# Patient Record
Sex: FEMALE | Race: WHITE | NOT HISPANIC OR LATINO | Employment: UNEMPLOYED
[De-identification: names, ages, dates, MRNs, and addresses within clinical notes are randomized per-mention and may not be internally consistent; named-entity substitution may affect disease eponyms.]

---

## 2017-08-27 ENCOUNTER — HEALTH MAINTENANCE LETTER (OUTPATIENT)
Age: 32
End: 2017-08-27

## 2017-11-11 ENCOUNTER — HOSPITAL ENCOUNTER (OUTPATIENT)
Dept: OBGYN | Facility: HOSPITAL | Age: 32
Discharge: HOME | End: 2017-11-12
Attending: OBSTETRICS & GYNECOLOGY | Admitting: OBSTETRICS & GYNECOLOGY

## 2017-11-11 ASSESSMENT — MIFFLIN-ST. JEOR: SCORE: 1425.24

## 2017-12-18 ENCOUNTER — RECORDS - HEALTHEAST (OUTPATIENT)
Dept: ADMINISTRATIVE | Facility: OTHER | Age: 32
End: 2017-12-18

## 2017-12-18 ENCOUNTER — ANESTHESIA - HEALTHEAST (OUTPATIENT)
Dept: OBGYN | Facility: HOSPITAL | Age: 32
End: 2017-12-18

## 2017-12-18 ENCOUNTER — SURGERY - HEALTHEAST (OUTPATIENT)
Dept: OBGYN | Facility: HOSPITAL | Age: 32
End: 2017-12-18

## 2017-12-18 ASSESSMENT — MIFFLIN-ST. JEOR: SCORE: 1477.4

## 2017-12-19 ENCOUNTER — HOME CARE/HOSPICE - HEALTHEAST (OUTPATIENT)
Dept: HOME HEALTH SERVICES | Facility: HOME HEALTH | Age: 32
End: 2017-12-19

## 2021-02-10 ENCOUNTER — OFFICE VISIT (OUTPATIENT)
Dept: FAMILY MEDICINE | Facility: CLINIC | Age: 36
End: 2021-02-10
Payer: COMMERCIAL

## 2021-02-10 VITALS
WEIGHT: 161.2 LBS | RESPIRATION RATE: 18 BRPM | BODY MASS INDEX: 29.66 KG/M2 | HEIGHT: 62 IN | SYSTOLIC BLOOD PRESSURE: 120 MMHG | OXYGEN SATURATION: 99 % | HEART RATE: 97 BPM | TEMPERATURE: 98.4 F | DIASTOLIC BLOOD PRESSURE: 66 MMHG

## 2021-02-10 DIAGNOSIS — B96.89 BV (BACTERIAL VAGINOSIS): ICD-10-CM

## 2021-02-10 DIAGNOSIS — Z30.8 ENCOUNTER FOR OTHER CONTRACEPTIVE MANAGEMENT: ICD-10-CM

## 2021-02-10 DIAGNOSIS — R10.2 PELVIC PRESSURE IN FEMALE: ICD-10-CM

## 2021-02-10 DIAGNOSIS — N76.0 BV (BACTERIAL VAGINOSIS): ICD-10-CM

## 2021-02-10 DIAGNOSIS — Z01.411 ENCOUNTER FOR GYNECOLOGICAL EXAMINATION WITH ABNORMAL FINDING: Primary | ICD-10-CM

## 2021-02-10 DIAGNOSIS — K64.9 HEMORRHOIDS, UNSPECIFIED HEMORRHOID TYPE: ICD-10-CM

## 2021-02-10 LAB
ALBUMIN UR-MCNC: NEGATIVE MG/DL
APPEARANCE UR: CLEAR
BILIRUB UR QL STRIP: ABNORMAL
COLOR UR AUTO: YELLOW
GLUCOSE UR STRIP-MCNC: NEGATIVE MG/DL
HCG UR QL: NEGATIVE
HGB UR QL STRIP: ABNORMAL
KETONES UR STRIP-MCNC: ABNORMAL MG/DL
LEUKOCYTE ESTERASE UR QL STRIP: NEGATIVE
NITRATE UR QL: NEGATIVE
NON-SQ EPI CELLS #/AREA URNS LPF: ABNORMAL /LPF
PH UR STRIP: 5.5 PH (ref 5–7)
RBC #/AREA URNS AUTO: ABNORMAL /HPF
SOURCE: ABNORMAL
SP GR UR STRIP: >1.03 (ref 1–1.03)
SPECIMEN SOURCE: ABNORMAL
UROBILINOGEN UR STRIP-ACNC: 0.2 EU/DL (ref 0.2–1)
WBC #/AREA URNS AUTO: ABNORMAL /HPF
WET PREP SPEC: ABNORMAL

## 2021-02-10 PROCEDURE — 87591 N.GONORRHOEAE DNA AMP PROB: CPT | Performed by: NURSE PRACTITIONER

## 2021-02-10 PROCEDURE — 99204 OFFICE O/P NEW MOD 45 MIN: CPT | Performed by: NURSE PRACTITIONER

## 2021-02-10 PROCEDURE — 87624 HPV HI-RISK TYP POOLED RSLT: CPT | Performed by: NURSE PRACTITIONER

## 2021-02-10 PROCEDURE — 87491 CHLMYD TRACH DNA AMP PROBE: CPT | Performed by: NURSE PRACTITIONER

## 2021-02-10 PROCEDURE — 81001 URINALYSIS AUTO W/SCOPE: CPT | Performed by: NURSE PRACTITIONER

## 2021-02-10 PROCEDURE — 81025 URINE PREGNANCY TEST: CPT | Performed by: NURSE PRACTITIONER

## 2021-02-10 PROCEDURE — 87210 SMEAR WET MOUNT SALINE/INK: CPT | Performed by: NURSE PRACTITIONER

## 2021-02-10 RX ORDER — NORELGESTROMIN AND ETHINYL ESTRADIOL 35; 150 UG/MG; UG/MG
PATCH TRANSDERMAL
Qty: 9 PATCH | Refills: 3 | Status: SHIPPED | OUTPATIENT
Start: 2021-02-10 | End: 2022-01-04

## 2021-02-10 RX ORDER — METRONIDAZOLE 500 MG/1
500 TABLET ORAL 2 TIMES DAILY
Qty: 14 TABLET | Refills: 0 | Status: SHIPPED | OUTPATIENT
Start: 2021-02-10 | End: 2021-02-17

## 2021-02-10 ASSESSMENT — MIFFLIN-ST. JEOR: SCORE: 1378.42

## 2021-02-10 NOTE — PROGRESS NOTES
Assessment & Plan     Encounter for gynecological examination with abnormal finding    - Pap imaged thin layer diagnostic with HPV (select HPV order below)  - HPV High Risk Types DNA Cervical    Pelvic pressure in female  Acute, pelvic pressure. History and current issue with constipation also. Urinalysis negative for infection, need hydration. Wet prep positive for bacterial vaginosis, treat as below. STD testing in process. Negative urine pregnancy. Will treat below pressure not improved patient to return to clinic.  - *UA reflex to Microscopic and Culture ([place] and [facility] (except [place] and [place])  - Wet prep  - HCG Qual, Urine ([id number])  - Urine Microscopic  - Chlamydia trachomatis PCR  - Neisseria gonorrhoeae PCR    Encounter for other contraceptive management  Not currently on contraception, wishes to start again as is more sexually active. Has been on patch before and did well. Will restart patch, check in in 3 months if any concerns.  - norelgestromin-ethinyl estradiol (ORTHO EVRA) 150-35 MCG/24HR patch; Remove old patch and apply new patch onto the skin once a week for 3 weeks (21 days). Do not wear patch week 4 (days 22-28), then repeat.    BV (bacterial vaginosis)  Wet prep positive for bacterial vaginosis. Treat with Flagyl twice daily for 7 days. Return to clinic if symptoms not improving or worsening.  - metroNIDAZOLE (FLAGYL) 500 MG tablet; Take 1 tablet (500 mg) by mouth 2 times daily for 7 days    Hemorrhoids, unspecified hemorrhoid type  Acute flare, recent constipation likely cause. Discussed at length increasing fiber and water intake. Recommend clearing out with one capful of MiraLAX nightly along with 2 Senokot until stools soft and clear. Then can use MiraLAX as needed. Otherwise follow with the guidance below for hemorrhoid treatment. If continues to have blood in stool please contact provider and will order a colonoscopy.                Tobacco Cessation:   reports that  "she has been smoking cigarettes. She has been smoking about 0.50 packs per day. She has never used smokeless tobacco.    BMI:   Estimated body mass index is 29.25 kg/m  as calculated from the following:    Height as of this encounter: 1.581 m (5' 2.25\").    Weight as of this encounter: 73.1 kg (161 lb 3.2 oz).       See Patient Instructions    Return in about 2 weeks (around 2/24/2021), or if symptoms worsen or fail to improve.    Alexia Druán, PRESTON CNP  [facility]    Yulissa Josue is a 35 year old who presents for the following health issues     HPI   Chief Complaint   Patient presents with     Rectal Problem     UTI     Contraception     Gyn Exam     pap, history of abnormal         Hemorrhoids  Onset/Duration: 2 weeks   Description:   Purnima-anal lump: YES  Pain: YES  Itching: YES  Accompanying Signs & Symptoms:  Blood in stool: YES - will be on underwear, with wiping, with stools  Changes in stool pattern: YES- constipation  History:   Any previous GI studies done:none  Family History of colon cancer: no  Precipitating factors:   None  Alleviating factors:  None  Therapies tried and outcome: miralax - once a week, A&D ointment to rectum    Had when was pregnant  Needs to drink more water  Fiber intake normal     Genitourinary - Female  Onset/Duration: worse last 2 days  Description:   Painful urination (Dysuria): YES           Frequency: YES  Blood in urine (Hematuria): no  Delay in urine (Hesitency): YES  Intensity: moderate  Progression of Symptoms:  worsening  Accompanying Signs & Symptoms:  Fever/chills: no  Flank pain: YES  Nausea and vomiting: no  Vaginal symptoms: pain  Abdominal/Pelvic Pain: YES  History:   History of frequent UTI s: YES in past year  History of kidney stones: no  Sexually Active: YES  Possibility of pregnancy: Don't Know  Precipitating or alleviating factors: None  Therapies tried and outcome:  Azo     Has intermittent shooting pain through vaginal area " "- every couple of days. Lasts for seconds and goes away     Contraception             Problems with current birth control method: NA  Method interested in: patch              Methods used previously: pill and patch  Problems with previous methods: no    History of pregnancies:         Patient's last menstrual period was 2021.           Lab Results   Component Value Date    PAP NIL 2012     : 6  Para: 4  Menstrual cycle: regular  Flow: medium        Breakthrough bleeding occurring? 0 What week of her month? 0  History of migraines: no but has been having headaches with vomiting for 2 months around her period time  Smoker: YES  1st degree relative with History of: 0                 Regular self breast exam: yes    Precipitating and/or Alleviating factors:    Currently sexually active: YES  Male, Female, both: male, condom  In stable relationship: YES  Desire STD testing: YES  Are you planning a pregnancy soon: no                 Body mass index is 29.25 kg/m .    Consider Labs: UPT, STD: Chlamydia/GONORRHEA screen, Pap      Review of Systems   Constitutional, HEENT, cardiovascular, pulmonary, gi and gu systems are negative, except as otherwise noted.      Objective    /66   Pulse 97   Temp 98.4  F (36.9  C)   Resp 18   Ht 1.581 m (5' 2.25\")   Wt 73.1 kg (161 lb 3.2 oz)   LMP 2021   SpO2 99%   Breastfeeding No   BMI 29.25 kg/m    Body mass index is 29.25 kg/m .  Physical Exam   GENERAL APPEARANCE: healthy, alert and no distress  RESP: lungs clear to auscultation - no rales, rhonchi or wheezes  CV: regular rates and rhythm, normal S1 S2, no S3 or S4 and no murmur, click or rub  ABDOMEN: soft, nontender, without hepatosplenomegaly or masses and bowel sounds normal   (female): normal cervix, adnexae, and uterus without masses or discharge  MS: extremities normal- no gross deformities noted  SKIN: no suspicious lesions or rashes  NEURO: Normal strength and tone, mentation intact " and speech normal  PSYCH: mentation appears normal and affect normal/bright    Results for orders placed or performed in visit on 02/10/21   *UA reflex to Microscopic and Culture ([place] and [facility] (except [place] and [place])     Status: Abnormal    Specimen: Midstream Urine   Result Value Ref Range    Color Urine Yellow     Appearance Urine Clear     Glucose Urine Negative NEG^Negative mg/dL    Bilirubin Urine Small (A) NEG^Negative    Ketones Urine Trace (A) NEG^Negative mg/dL    Specific Gravity Urine >1.030 1.003 - 1.035    Blood Urine Moderate (A) NEG^Negative    pH Urine 5.5 5.0 - 7.0 pH    Protein Albumin Urine Negative NEG^Negative mg/dL    Urobilinogen Urine 0.2 0.2 - 1.0 EU/dL    Nitrite Urine Negative NEG^Negative    Leukocyte Esterase Urine Negative NEG^Negative    Source Midstream Urine    HCG Qual, Urine ([id number])     Status: None   Result Value Ref Range    HCG Qual Urine Negative NEG^Negative   Urine Microscopic     Status: Abnormal   Result Value Ref Range    WBC Urine 0 - 5 OTO5^0 - 5 /HPF    RBC Urine O - 2 OTO2^O - 2 /HPF    Squamous Epithelial /LPF Urine Many (A) FEW^Few /LPF   Wet prep     Status: Abnormal    Specimen: Vagina   Result Value Ref Range    Specimen Description Vagina     Wet Prep No Trichomonas seen     Wet Prep Clue cells seen  Moderate   (A)     Wet Prep No yeast seen     Wet Prep No WBC's seen    Chlamydia trachomatis PCR     Status: None    Specimen: Cervix   Result Value Ref Range    Specimen Description Cervix     Chlamydia Trachomatis PCR Negative NEG^Negative   Neisseria gonorrhoeae PCR     Status: None    Specimen: Cervix   Result Value Ref Range    Specimen Descrip Cervix     N Gonorrhea PCR Negative NEG^Negative

## 2021-02-10 NOTE — LETTER
February 12, 2021      Liat Gold  [address] [facility] [address]        Dear ,    We are writing to inform you of your test results.    Your test results fall within the expected range(s) or remain unchanged from previous results.  Please continue with current treatment plan.    Resulted Orders   *UA reflex to Microscopic and Culture ([place] and [facility] (except [place] and [place])   Result Value Ref Range    Color Urine Yellow     Appearance Urine Clear     Glucose Urine Negative NEG^Negative mg/dL    Bilirubin Urine Small (A) NEG^Negative      Comment:      This is an unconfirmed screening test result. A positive result may be false.    Ketones Urine Trace (A) NEG^Negative mg/dL    Specific Gravity Urine >1.030 1.003 - 1.035    Blood Urine Moderate (A) NEG^Negative    pH Urine 5.5 5.0 - 7.0 pH    Protein Albumin Urine Negative NEG^Negative mg/dL    Urobilinogen Urine 0.2 0.2 - 1.0 EU/dL    Nitrite Urine Negative NEG^Negative    Leukocyte Esterase Urine Negative NEG^Negative    Source Midstream Urine    Wet prep   Result Value Ref Range    Specimen Description Vagina     Wet Prep No Trichomonas seen     Wet Prep Clue cells seen  Moderate   (A)     Wet Prep No yeast seen     Wet Prep No WBC's seen    HCG Qual, Urine ([id number])   Result Value Ref Range    HCG Qual Urine Negative NEG^Negative      Comment:      This test is for screening purposes.  Results should be interpreted along with   the clinical picture.  Confirmation testing is available if warranted by   ordering [id number], HCG Quantitative Pregnancy.     Urine Microscopic   Result Value Ref Range    WBC Urine 0 - 5 OTO5^0 - 5 /HPF    RBC Urine O - 2 OTO2^O - 2 /HPF    Squamous Epithelial /LPF Urine Many (A) FEW^Few /LPF   Chlamydia trachomatis PCR   Result Value Ref Range    Specimen Description Cervix     Chlamydia Trachomatis PCR Negative NEG^Negative      Comment:      Negative for C. trachomatis rRNA by transcription mediated  amplification.  A negative result by transcription mediated amplification does not preclude   the presence of C. trachomatis infection because results are dependent on   proper and adequate collection, absence of inhibitors, and sufficient rRNA to   be detected.     Neisseria gonorrhoeae PCR   Result Value Ref Range    Specimen Descrip Cervix     N Gonorrhea PCR Negative NEG^Negative      Comment:      Negative for N. gonorrhoeae rRNA by transcription mediated amplification.  A negative result by transcription mediated amplification does not preclude   the presence of N. gonorrhoeae infection because results are dependent on   proper and adequate collection, absence of inhibitors, and sufficient rRNA to   be detected.         If you have any questions or concerns, please call the clinic at the number listed above.       Sincerely,      PRESTON Posadas CNP

## 2021-02-10 NOTE — PATIENT INSTRUCTIONS
Encounter for gynecological examination with abnormal finding    - Pap imaged thin layer diagnostic with HPV (select HPV order below)  - HPV High Risk Types DNA Cervical    Pelvic pressure in female  Acute, pelvic pressure. History and current issue with constipation also. Urinalysis negative for infection, need hydration. Wet prep positive for bacterial vaginosis, treat as below. STD testing in process. Negative urine pregnancy. Will treat below pressure not improved patient to return to clinic.  - *UA reflex to Microscopic and Culture ([place] and [facility] (except [place] and [place])  - Wet prep  - HCG Qual, Urine ([id number])  - Urine Microscopic  - Chlamydia trachomatis PCR  - Neisseria gonorrhoeae PCR    Encounter for other contraceptive management  Not currently on contraception, wishes to start again as is more sexually active. Has been on patch before and did well. Will restart patch, check in in 3 months if any concerns.  - norelgestromin-ethinyl estradiol (ORTHO EVRA) 150-35 MCG/24HR patch; Remove old patch and apply new patch onto the skin once a week for 3 weeks (21 days). Do not wear patch week 4 (days 22-28), then repeat.    BV (bacterial vaginosis)  Wet prep positive for bacterial vaginosis. Treat with Flagyl twice daily for 7 days. Return to clinic if symptoms not improving or worsening.  - metroNIDAZOLE (FLAGYL) 500 MG tablet; Take 1 tablet (500 mg) by mouth 2 times daily for 7 days    Hemorrhoids, unspecified hemorrhoid type  Acute flare, recent constipation likely cause. Discussed at length increasing fiber and water intake. Recommend clearing out with one capful of MiraLAX nightly along with 2 Senokot until stools soft and clear. Then can use MiraLAX as needed. Otherwise follow with the guidance below for hemorrhoid treatment. If continues to have blood in stool please contact provider and will order a colonoscopy.        Patients should ingest 20 to 30 g of insoluble fiber per day and  drink plenty of water (1.5 to 2 liters per day). Both are necessary to produce regular, soft stools, which reduce straining at defecation. It could take six weeks to fully realize the beneficial effect of fiber.    Many commercially available fiber supplements are available to reduce constipation. Many contain either psyllium or methylcellulose. Neither has been shown to have a particular advantage over the other in treating hemorrhoidal disease. Because fiber has other salutary effects, is safe to use, and may help to prevent recurrence, we recommend augmentation of fiber in the diet indefinitely.    Adding fiber to the diet is beneficial for patients with bleeding    Fiber supplementation may relieve pruritus related to fecal soilage since the bulking effect of fiber may reduce leakage of liquid stool.    Patients should refrain from straining or lingering (eg, reading) on the toilet.     Patients should have regular physical exercise.    Patients should also limit their intake of fatty foods and alcohol, which can exacerbate constipation. Although a popular myth, eating spicy food (eg, red hot chili peppers) had no effect on hemorrhoid symptoms such as irritation and pruritus in a controlled study    Sitz baths are an intuitive topical treatment for acute flare-ups of hemorrhoids to reduce inflammation and edema and relax the sphincter muscles.    Continue to use Tucks pads and preparation H

## 2021-02-11 LAB
C TRACH DNA SPEC QL NAA+PROBE: NEGATIVE
N GONORRHOEA DNA SPEC QL NAA+PROBE: NEGATIVE
SPECIMEN SOURCE: NORMAL
SPECIMEN SOURCE: NORMAL

## 2021-02-15 LAB
COPATH REPORT: NORMAL
PAP: NORMAL

## 2021-02-17 LAB
FINAL DIAGNOSIS: NORMAL
HPV HR 12 DNA CVX QL NAA+PROBE: NEGATIVE
HPV16 DNA SPEC QL NAA+PROBE: NEGATIVE
HPV18 DNA SPEC QL NAA+PROBE: NEGATIVE
SPECIMEN DESCRIPTION: NORMAL
SPECIMEN SOURCE CVX/VAG CYTO: NORMAL

## 2021-02-18 ENCOUNTER — PATIENT OUTREACH (OUTPATIENT)
Dept: FAMILY MEDICINE | Facility: CLINIC | Age: 36
End: 2021-02-18

## 2021-02-18 NOTE — TELEPHONE ENCOUNTER
2/10/21 NIL Pap, Neg HPV. Plan cotest in 1 year.   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 (2019 ASCCP guideline).

## 2021-02-18 NOTE — LETTER
February 18, 2021      Liat Gold  [address] [facility] [address]        Dear ,    We are happy to inform you that your recent Pap smear and Human Papillomavirus (HPV) test results are normal and negative.    It is recommended that you have your next Pap smear and Human Papillomavirus (HPV) test in 1 year. You will also need to return to the clinic every year for an annual wellness visit.    If you have additional questions regarding this result, please contact our office and we will be happy to assist you.      Sincerely,    Your [facility] Care Team/larry

## 2021-05-31 VITALS — WEIGHT: 170 LBS | BODY MASS INDEX: 30.12 KG/M2 | HEIGHT: 63 IN

## 2021-05-31 VITALS — HEIGHT: 62 IN | WEIGHT: 185 LBS | BODY MASS INDEX: 34.04 KG/M2

## 2021-06-14 NOTE — PROGRESS NOTES
SW received tox screen (pending THC). Per mandated reporting, SW has made report to [facility] and faxed information including tox screen to Jaida (f. [phone]).    FABIAN Jones  11/13/2017

## 2021-06-14 NOTE — ANESTHESIA PROCEDURE NOTES
Epidural Block    Patient location during procedure: OB  Time Called: 12/18/2017 9:05 AM  Reason for Block:at surgeon's request and labor epidural  Staffing:  Performing  Anesthesiologist: KAYLIE ELAM  Preanesthetic Checklist  Completed: patient identified, risks, benefits, and alternatives discussed, timeout performed, consent obtained, at patient's request, airway assessed, oxygen available, suction available, emergency drugs available and hand hygiene performed  Procedure  Patient position: sitting  Prep: ChloraPrep  Patient monitoring: continuous pulse oximetry, heart rate and blood pressure  Approach: midline  Location: L2-L3  Injection technique: JANIE air  Number of Attempts:1  Needle  Needle type: Tuohy   Needle gauge: 17 G     Catheter in Space: 5  Assessment  Sensory level: T10  No complications      Additional Notes:  No CSF.  No Heme.  Infusion connected after test dose negative.  No issues.  Patient tolerated well. Pump reviewed and started.  Patients vital signs stable.  No LAST.  RN was in room the whole time.

## 2021-06-14 NOTE — ANESTHESIA PREPROCEDURE EVALUATION
Anesthesia Evaluation      Patient summary reviewed   No history of anesthetic complications     Airway   Mallampati: III  Neck ROM: full   Pulmonary - normal exam    breath sounds clear to auscultation  (+) a smoker                         Cardiovascular - negative ROS and normal exam  Exercise tolerance: good  Rhythm: regular  Rate: normal,         Neuro/Psych    (+) depression, anxiety/panic attacks,     Endo/Other    (+) pregnant     GI/Hepatic/Renal - negative ROS      Other findings: Gravid. Denies PIH, GDM or Preeclampsia.  Patient on 120 mg of methadone/day.  She's had fent in her epidural infusion in the past w/o issue.        Dental    (+) poor dentition and chipped                       Anesthesia Plan  Planned anesthetic: epidural  Labor epidural risks and benefits discussed with patient.  All questions answered.  Consent signed.  Patient wishes to proceed.  RN present.    ASA 3     Anesthetic plan and risks discussed with: patient, spouse and parent/guardian  Anesthesia plan special considerations: increased risk of difficult airway,   Post-op plan: routine recovery

## 2021-06-14 NOTE — ANESTHESIA CARE TRANSFER NOTE
Last vitals:   Vitals:    12/18/17 1735   BP: 107/67   Pulse: 88   Resp: 16   Temp: 36.5  C (97.7  F)   SpO2: 98%     Patient's level of consciousness is awake  Spontaneous respirations: yes  Maintains airway independently: yes  Dentition unchanged: yes  Oropharynx: oropharynx clear of all foreign objects    QCDR Measures:  ASA# 20 - Surgical Safety Checklist: WHO surgical safety checklist completed prior to induction  PQRS# 430 - Adult PONV Prevention: NA - Not adult patient, not GA or 3 or more risk factors NOT present  ASA# 8 - Peds PONV Prevention: NA - Not pediatric patient, not GA or 2 or more risk factors NOT present  PQRS# 424 - Purnima-op Temp Management: 4559F - At least one body temp DOCUMENTED => 35.5C or 95.9F within required timeframe  PQRS# 426 - PACU Transfer Protocol: - Transfer of care checklist used  ASA# 14 - Acute Post-op Pain: ASA14B - Patient did NOT experience pain >= 7 out of 10

## 2021-07-14 PROBLEM — Z34.90 PREGNANT: Status: RESOLVED | Noted: 2017-12-18 | Resolved: 2017-12-19

## 2022-01-04 ENCOUNTER — TELEPHONE (OUTPATIENT)
Dept: FAMILY MEDICINE | Facility: CLINIC | Age: 37
End: 2022-01-04
Payer: COMMERCIAL

## 2022-01-04 DIAGNOSIS — Z30.8 ENCOUNTER FOR OTHER CONTRACEPTIVE MANAGEMENT: ICD-10-CM

## 2022-01-04 RX ORDER — NORELGESTROMIN AND ETHINYL ESTRADIOL 150; 35 UG/D; UG/D
PATCH TRANSDERMAL
Qty: 9 PATCH | Refills: 0 | Status: SHIPPED | OUTPATIENT
Start: 2022-01-04 | End: 2022-03-25

## 2022-01-04 NOTE — TELEPHONE ENCOUNTER
Pt says she needs to change her patch on Thursday. She has upcoming apt with Alexia 1/19  Please refill

## 2022-01-19 ENCOUNTER — OFFICE VISIT (OUTPATIENT)
Dept: FAMILY MEDICINE | Facility: CLINIC | Age: 37
End: 2022-01-19
Payer: COMMERCIAL

## 2022-01-19 VITALS
BODY MASS INDEX: 30.55 KG/M2 | TEMPERATURE: 98.3 F | WEIGHT: 166 LBS | OXYGEN SATURATION: 98 % | SYSTOLIC BLOOD PRESSURE: 120 MMHG | HEART RATE: 74 BPM | HEIGHT: 62 IN | RESPIRATION RATE: 20 BRPM | DIASTOLIC BLOOD PRESSURE: 82 MMHG

## 2022-01-19 DIAGNOSIS — Z20.822 SUSPECTED COVID-19 VIRUS INFECTION: Primary | ICD-10-CM

## 2022-01-19 DIAGNOSIS — Z30.09 ENCOUNTER FOR OTHER GENERAL COUNSELING OR ADVICE ON CONTRACEPTION: ICD-10-CM

## 2022-01-19 DIAGNOSIS — R50.9 FEVER, UNSPECIFIED FEVER CAUSE: ICD-10-CM

## 2022-01-19 DIAGNOSIS — R05.9 COUGH: ICD-10-CM

## 2022-01-19 DIAGNOSIS — M79.10 MYALGIA: ICD-10-CM

## 2022-01-19 LAB — SARS-COV-2 RNA RESP QL NAA+PROBE: NEGATIVE

## 2022-01-19 PROCEDURE — 99213 OFFICE O/P EST LOW 20 MIN: CPT | Performed by: NURSE PRACTITIONER

## 2022-01-19 PROCEDURE — U0005 INFEC AGEN DETEC AMPLI PROBE: HCPCS | Performed by: NURSE PRACTITIONER

## 2022-01-19 PROCEDURE — U0003 INFECTIOUS AGENT DETECTION BY NUCLEIC ACID (DNA OR RNA); SEVERE ACUTE RESPIRATORY SYNDROME CORONAVIRUS 2 (SARS-COV-2) (CORONAVIRUS DISEASE [COVID-19]), AMPLIFIED PROBE TECHNIQUE, MAKING USE OF HIGH THROUGHPUT TECHNOLOGIES AS DESCRIBED BY CMS-2020-01-R: HCPCS | Performed by: NURSE PRACTITIONER

## 2022-01-19 ASSESSMENT — MIFFLIN-ST. JEOR: SCORE: 1391.22

## 2022-01-19 NOTE — NURSING NOTE
"Chief Complaint   Patient presents with     Contraception     URI     nausea       Initial /82 (BP Location: Right arm)   Pulse 74   Temp 98.3  F (36.8  C) (Tympanic)   Resp 20   Ht 1.575 m (5' 2\")   Wt 75.3 kg (166 lb)   LMP 01/07/2022   SpO2 98%   BMI 30.36 kg/m   Estimated body mass index is 30.36 kg/m  as calculated from the following:    Height as of this encounter: 1.575 m (5' 2\").    Weight as of this encounter: 75.3 kg (166 lb).    Patient presents to the clinic using No DME    Health Maintenance that is potentially due pending provider review:  NONE    n/a    Is there anyone who you would like to be able to receive your results? No  If yes have patient fill out AUDREY    "

## 2022-01-19 NOTE — PATIENT INSTRUCTIONS
Suspected COVID-19 virus infection  COVID like symptoms x 4 days, took 2 at home tests which were negative. Still feverish this morning. Would recommend PCR testing, patient agreeable. No work until results back. Continue to quarantine and supportive cares. Would recommend over the counter Flonase nasal spray - 2 sprays each nostril daily x 5-7 days.   - Symptomatic; Yes; 1/16/2022 COVID-19 Virus (Coronavirus) by PCR Nose    Cough  Cough, accompanied by other COVID likes symptoms.     Fever, unspecified fever cause  Fever as above.     Myalgia  Body aches accompanied by other symptoms, possible COVID. Will await results.     Encounter for other general counseling or advice on contraception  Patient currently on contraceptive patch, would like further discussion on permanent sterilization. Ob/gyn referral placed.   - Ob/Gyn Referral; Future

## 2022-01-19 NOTE — PROGRESS NOTES
"Assessment & Plan     Suspected COVID-19 virus infection  COVID like symptoms x 4 days, took 2 at home tests which were negative. Still feverish this morning. Would recommend PCR testing, patient agreeable. No work until results back. Continue to quarantine and supportive cares. Would recommend over the counter Flonase nasal spray - 2 sprays each nostril daily x 5-7 days.   - Symptomatic; Yes; 1/16/2022 COVID-19 Virus (Coronavirus) by PCR Nose    Cough  Cough, accompanied by other COVID likes symptoms.     Fever, unspecified fever cause  Fever as above.     Myalgia  Body aches accompanied by other symptoms, possible COVID. Will await results.     Encounter for other general counseling or advice on contraception  Patient currently on contraceptive patch, would like further discussion on permanent sterilization. Ob/gyn referral placed.   - Ob/Gyn Referral; Future           Tobacco Cessation:   reports that she has been smoking cigarettes. She has been smoking about 0.50 packs per day. She has never used smokeless tobacco.  Tobacco Cessation Action Plan: Deferred to primary care provider    BMI:    Estimated body mass index is 29.25 kg/m  as calculated from the following:    Height as of 2/10/21: 1.581 m (5' 2.25\").    Weight as of 2/10/21: 73.1 kg (161 lb 3.2 oz).   Weight management plan: Patient was referred to their PCP to discuss a diet and exercise plan.    See Patient Instructions    No follow-ups on file.    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    Objective   There were no vitals taken for this visit.  There is no height or weight on file to calculate BMI.    Physical Exam  GENERAL APPEARANCE: healthy, alert, no distress and fatigued  EYES: Eyes grossly normal to inspection, PERRL and conjunctivae and sclerae normal  HENT: ear canals and TM's normal and nose and mouth without ulcers or lesions  NECK: no adenopathy, no asymmetry, masses, or scars and thyroid normal to palpation  RESP: lungs clear to auscultation - no rales, rhonchi or wheezes  CV: regular rates and rhythm, normal S1 S2, no S3 or S4 and no murmur, click or rub  ABDOMEN: soft, nontender, without hepatosplenomegaly or masses and bowel sounds normal  MS: extremities normal- no gross deformities noted  SKIN: no suspicious lesions or rashes  PSYCH: mentation appears normal and affect normal/bright    Diagnostic Test Results:  Results for orders placed or performed in visit on 01/19/22   Symptomatic; Yes; 1/16/2022 COVID-19 Virus (Coronavirus) by PCR Nose     Status: Normal    Specimen: Nose; Swab   Result Value Ref Range    SARS CoV2 PCR Negative Negative, Testing sent to reference lab. Results will be returned via unsolicited result    Narrative    Testing was performed using the Xpert Xpress SARS-CoV-2 Assay on the  [employer]. Additional information about  this Emergency Use Authorization (EUA) assay can be found via the Lab  Guide. This test should be ordered for  the detection of SARS-CoV-2 in  individuals who meet SARS-CoV-2 clinical and/or epidemiological  criteria. Test performance is unknown in asymptomatic patients. This  test is for in vitro diagnostic use under the FDA EUA for  laboratories certified under CLIA to perform high complexity testing.  This test has not been FDA cleared or approved. A negative result  does not rule out the presence of PCR inhibitors in the specimen or  target RNA in concentration below the limit of detection for the  assay. The possibility of a false negative should be considered if  the patient's recent exposure or clinical presentation suggests  COVID-19. This test was validated by the [facility]. This laboratory is certified under  the Clinical Laboratory Improvement Amendments of 1988 (CLIA-88) as  qualified to perform high complexity laboratory testing.          Chart documentation with Dragon Voice recognition Software. Although reviewed after completion, some words and grammatical errors may remain.

## 2022-01-19 NOTE — LETTER
[facility]  [address] [place] [id number]  [phone]          January 19, 2022    Liat Gold                                                                                                                     [id number] [facility] [address]      To Whom it May Concern,    Liat was evaluated in clinic and needs to be off of work through 1/21/2022 due to illness.         Sincerely,     Alexia Diaz, DNP, APRN-CNP

## 2022-01-25 ENCOUNTER — PATIENT OUTREACH (OUTPATIENT)
Dept: FAMILY MEDICINE | Facility: CLINIC | Age: 37
End: 2022-01-25
Payer: COMMERCIAL

## 2022-01-25 DIAGNOSIS — D06.9 SEVERE DYSPLASIA OF CERVIX (CIN III): ICD-10-CM

## 2022-01-25 NOTE — LETTER
January 25, 2022      Liat Gold  [address] [facility] [address]        Dear MsNyasiaAsif,    This letter is to remind you that you are due for your follow-up Pap smear and Human Papillomavirus (HPV) test.    Please call [phone] to schedule your appointment at your earliest convenience.    If you have completed the appointment outside of the [facility] system, please have the records forwarded to our office. We will update your chart for your provider to review before your next annual wellness visit.     Thank you for choosing [facility]!      Sincerely,    Your [facility] Care Team

## 2022-02-14 ENCOUNTER — TELEPHONE (OUTPATIENT)
Dept: FAMILY MEDICINE | Facility: CLINIC | Age: 37
End: 2022-02-14
Payer: COMMERCIAL

## 2022-02-14 NOTE — TELEPHONE ENCOUNTER
Ph. [phone]    Out of work letter for employer due to covid. Patient's children are now positive.  Patient states she can return to work on 02/18/21.    Please advise    Abimbola Landis

## 2022-02-16 NOTE — TELEPHONE ENCOUNTER
I talked with Liat and she needs note to return. She will call her work back and see exactly what they want  Lisa Leyva RN

## 2022-03-25 ENCOUNTER — PREP FOR PROCEDURE (OUTPATIENT)
Dept: OBGYN | Facility: CLINIC | Age: 37
End: 2022-03-25

## 2022-03-25 ENCOUNTER — OFFICE VISIT (OUTPATIENT)
Dept: OBGYN | Facility: CLINIC | Age: 37
End: 2022-03-25
Payer: COMMERCIAL

## 2022-03-25 ENCOUNTER — TELEPHONE (OUTPATIENT)
Dept: OBGYN | Facility: CLINIC | Age: 37
End: 2022-03-25

## 2022-03-25 VITALS
WEIGHT: 168.1 LBS | RESPIRATION RATE: 12 BRPM | DIASTOLIC BLOOD PRESSURE: 76 MMHG | TEMPERATURE: 97.1 F | BODY MASS INDEX: 30.93 KG/M2 | HEART RATE: 61 BPM | HEIGHT: 62 IN | SYSTOLIC BLOOD PRESSURE: 108 MMHG

## 2022-03-25 DIAGNOSIS — Z12.4 CERVICAL CANCER SCREENING: ICD-10-CM

## 2022-03-25 DIAGNOSIS — Z11.59 ENCOUNTER FOR SCREENING FOR OTHER VIRAL DISEASES: Primary | ICD-10-CM

## 2022-03-25 DIAGNOSIS — Z30.09 STERILIZATION CONSULT: Primary | ICD-10-CM

## 2022-03-25 DIAGNOSIS — Z30.8 ENCOUNTER FOR OTHER CONTRACEPTIVE MANAGEMENT: ICD-10-CM

## 2022-03-25 DIAGNOSIS — Z30.2 ENCOUNTER FOR STERILIZATION: Primary | ICD-10-CM

## 2022-03-25 PROCEDURE — G0145 SCR C/V CYTO,THINLAYER,RESCR: HCPCS | Performed by: OBSTETRICS & GYNECOLOGY

## 2022-03-25 PROCEDURE — 87624 HPV HI-RISK TYP POOLED RSLT: CPT | Performed by: OBSTETRICS & GYNECOLOGY

## 2022-03-25 PROCEDURE — 99203 OFFICE O/P NEW LOW 30 MIN: CPT | Performed by: OBSTETRICS & GYNECOLOGY

## 2022-03-25 RX ORDER — NORELGESTROMIN AND ETHINYL ESTRADIOL 150; 35 UG/D; UG/D
1 PATCH TRANSDERMAL WEEKLY
Qty: 8 PATCH | Refills: 0 | Status: SHIPPED | OUTPATIENT
Start: 2022-03-25 | End: 2022-05-04

## 2022-03-25 RX ORDER — ONDANSETRON 4 MG/1
TABLET, ORALLY DISINTEGRATING ORAL
COMMUNITY
Start: 2022-03-16 | End: 2023-04-25

## 2022-03-25 RX ORDER — ACETAMINOPHEN 325 MG/1
975 TABLET ORAL ONCE
Status: CANCELLED | OUTPATIENT
Start: 2022-03-25 | End: 2022-03-25

## 2022-03-25 NOTE — PROGRESS NOTES
[facility]    Gynecology Office Note    CC:   Chief Complaint   Patient presents with     Consult        HPI: Liat Gold is a 37 year old  who presents for sterilization consult. Patient has four sons and is certain she does not want any more children. She has been using the birth control patch for contraception and would like a permanent option. She is also due for a pap smear and would like this done as well.     GYN Hx:     Patient's last menstrual period was 2022 (approximate).    Contraception: birth control patch  Last Pap Smear:   Lab Results   Component Value Date    PAP NIL 02/10/2021   HPV negative    Abnormal Pap Smears: yes, s/p LEEP  Sexual Activity: currently sexually active with one male partner for the past 10 years.      ROS: A 10 pt ROS was completed and found to be otherwise negative unless mentioned in the HPI.     PMH:   Past Medical History:   Diagnosis Date     ALISSA 3     LEEP     Irritable bowel syndrome        PSHx:   Past Surgical History:   Procedure Laterality Date     APPENDECTOMY  2007     APPENDECTOMY       BIOPSY CERVICAL, LOCAL EXCISION, SINGLE/MULTIPLE  2007     BIOPSY CERVICAL, LOCAL EXCISION, SINGLE/MULTIPLE        SECTION N/A 2017    Procedure:  SECTION;  Surgeon: Lulú Lee MD;  Location: [facility];  Service:      D & C  2009     LEEP TX, CERVICAL  2009    ALISSA 3     PELVIS LAPAROSCOPY,DX  x 2    abdominal pain     WISDOM TOOTH EXTRACTION         OBHx:   OB History    Para Term  AB Living   6 4 4 0 2 4   SAB IAB Ectopic Multiple Live Births   1 1 0 0 4      # Outcome Date GA Lbr Silvio/2nd Weight Sex Delivery Anes PTL Lv   6 Term 2017     CS-Unspec   REBECCA   5 Term 2016     Vag-Spont   REBECCA   4 Term      Vag-Spont   REBECCA   3 Term 07 37w0d 06:24 2.693 kg (5 lb 15 oz) M IVD EPIDURAL  REBECCA      Name: Dakotaelliottmarianne      Apgar1: 8  Apgar5: 9   2 SAB      SAB     "  1 IAB                Medications:   ** PATIENT ALERT **, DO NOT EXCEED 4,000 MG OF ACETAMINOPHEN FROM ALL SOURCES IN 24 HOURS (Patient not taking: Reported on 3/25/2022)  ondansetron (ZOFRAN-ODT) 4 MG ODT tab, DISSOLVE 1 TABLET IN MOUTH TWICE DAILY AS NEEDED    No current facility-administered medications on file prior to visit.      Allergies:      Allergies   Allergen Reactions     Codeine      Pcn [Penicillins]      \"doesn't work\"       Social History:   Social History     Socioeconomic History     Marital status: Single     Spouse name: Not on file     Number of children: Not on file     Years of education: Not on file     Highest education level: Not on file   Occupational History     Not on file   Tobacco Use     Smoking status: Current Every Day Smoker     Packs/day: 0.50     Types: Cigarettes     Smokeless tobacco: Never Used     Tobacco comment: using E-Cig   Substance and Sexual Activity     Alcohol use: No     Drug use: No     Comment: marijuana last use 11/2/06.  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     Sexual activity: Yes     Partners: Male     Birth control/protection: Pill   Other Topics Concern     Parent/sibling w/ CABG, MI or angioplasty before 65F 55M? No   Social History Narrative     Not on file     Social Determinants of Health     Financial Resource Strain: Not on file   Food Insecurity: Not on file   Transportation Needs: Not on file   Physical Activity: Not on file   Stress: Not on file   Social Connections: Not on file   Intimate Partner Violence: Not on file   Housing Stability: Not on file         Family History:   Family History   Problem Relation Age of Onset     Gastrointestinal Disease Mother         IBS     Cancer Maternal Grandfather         lung     Gastrointestinal Disease Maternal Grandmother         IBS     Cancer - colorectal Paternal Grandmother      Asthma No family hx of      C.A.D. No family hx of      Diabetes No family hx of      Hypertension " "No family hx of      Cerebrovascular Disease No family hx of      Breast Cancer No family hx of      Prostate Cancer No family hx of        Physical Exam:   Vitals:    03/25/22 1334   BP: 108/76   BP Location: Right arm   Patient Position: Sitting   Cuff Size: Adult Regular   Pulse: 61   Resp: 12   Temp: 97.1  F (36.2  C)   TempSrc: Tympanic   Weight: 76.2 kg (168 lb 1.6 oz)   Height: 1.575 m (5' 2\")      Estimated body mass index is 30.75 kg/m  as calculated from the following:    Height as of this encounter: 1.575 m (5' 2\").    Weight as of this encounter: 76.2 kg (168 lb 1.6 oz).    General appearance: well-hydrated, A&O x 3, no apparent distress  Lungs: Equal expansion bilaterally, no accessory muscle use  Heart: No heaves or thrills. No peripheral varicosities  Constitutional: See vitals  Extremities: no edema  Neuro: CN II-XII grossly intact  Genitourinary:  External genitalia: no erythema. Small 0.5cm raised skin lesion on the right superior labia majora, not pigmented.  Anus and Perineum: Unremarkable, no visible lesions  Vagina: Normal, healthy pink mucosa without any lesions. Physiologic vaginal discharge.   Cervix: normal appearance, no cervical motion tenderness.       Assessment and Plan:     Encounter Diagnoses   Name Primary?     Cervical cancer screening      Encounter for other contraceptive management      Sterilization consult Yes     Patient desires permanent sterilization. Aware of irreversible nature of procedure. She is certain she does not want any more children. Discussed procedure of bilateral salpingectomy. Patient would also like labial skin lesion removed at time of surgery if possible. Discussed risks, benefits, alternatives, and expected post operative course. All questions answered. Federal tubal papers signed today. Will plan to schedule surgery with Dr. Tompkins after 30 day holding period.    Refill for birth control patch for now.    Health maintenance: pap smear collected " today    Return to clinic post operatively.    Nevaeh Gardner,

## 2022-03-25 NOTE — TELEPHONE ENCOUNTER
Changed procedure to laparoscopic bilateral tubal sterilization and removal of vulvar lesion    -Melissa NATARAJAN [facility] [place]

## 2022-03-25 NOTE — TELEPHONE ENCOUNTER
"[id number]  Liat Gold    You are now scheduled for surgery at [facility].  Below are the details for your surgery.  Please read the \"Preparing for Your Surgery\" instructions and let us know if you have any questions.    Type of surgery: SALPINGECTOMY, LAPAROSCOPIC (Bilateral)     Surgeon:  Sandra Tompkins MD  Location of surgery: [facility] OR    Date of surgery: 5-4-22    Time: 11:00am   Arrival Time: 10:00am    Time can change, to be confirmed a couple of days prior by pre-op surgery nurse.    Pre-Op Appt Date: Patient to schedule with a PCP or Family Practice Provider within 30 days to the surgery.  Post-Op Appt Date: To be determined by provider     COVID test 2-4 days prior at [facility] - not needed - covid positive 2-10-22    Packet sent out: Yes  Pre-cert/Authorization completed:  TBD by Financial Securing Office.   MA Sterilization/Hysterectomy Acknowledgment Consent signed: Yes 3-25-22    [facility]  [phone]    Fax: [phone]  Same Day Surgery [phone]  Fax: [phone]  [facility] [phone]    "

## 2022-03-25 NOTE — NURSING NOTE
"Initial /76 (BP Location: Right arm, Patient Position: Sitting, Cuff Size: Adult Regular)   Pulse 61   Temp 97.1  F (36.2  C) (Tympanic)   Resp 12   Ht 1.575 m (5' 2\")   Wt 76.2 kg (168 lb 1.6 oz)   LMP 03/02/2022 (Approximate)   BMI 30.75 kg/m   Estimated body mass index is 30.75 kg/m  as calculated from the following:    Height as of this encounter: 1.575 m (5' 2\").    Weight as of this encounter: 76.2 kg (168 lb 1.6 oz). .      "

## 2022-03-29 LAB
BKR LAB AP GYN ADEQUACY: NORMAL
BKR LAB AP GYN INTERPRETATION: NORMAL
BKR LAB AP HPV REFLEX: NORMAL
BKR LAB AP PREVIOUS ABNORMAL: NORMAL
PATH REPORT.COMMENTS IMP SPEC: NORMAL
PATH REPORT.COMMENTS IMP SPEC: NORMAL
PATH REPORT.RELEVANT HX SPEC: NORMAL

## 2022-03-31 ENCOUNTER — PATIENT OUTREACH (OUTPATIENT)
Dept: OBGYN | Facility: CLINIC | Age: 37
End: 2022-03-31
Payer: COMMERCIAL

## 2022-03-31 LAB
HUMAN PAPILLOMA VIRUS 16 DNA: NEGATIVE
HUMAN PAPILLOMA VIRUS 18 DNA: NEGATIVE
HUMAN PAPILLOMA VIRUS FINAL DIAGNOSIS: NORMAL
HUMAN PAPILLOMA VIRUS OTHER HR: NEGATIVE

## 2022-03-31 NOTE — LETTER
March 31, 2022      Liat Gold  [address] [facility] [address]        Dear ,    We are happy to inform you that your recent Pap smear and Human Papillomavirus (HPV) test results are normal and negative.    It is recommended that you have your next Pap smear and Human Papillomavirus (HPV) test in 1 year. You will also need to return to the clinic every year for an annual wellness visit.    If you have additional questions regarding this result, please contact our office and we will be happy to assist you.      Sincerely,    Your [facility] Care Team   Pt on 3 L NC.  No c/o nausea, tolerating PO fluids and medication.  Lower back surgical site CDI.  Hemovac compressed, patent and draining dark red fluid. Right leg 5/5 strength.  Left leg 5/5 strength.  Denies numbness in right leg.  Surgical pain tolerable per pt, 4/10.   VSS, afebrile, QUIGLEY, A/O x4.    Glasses in place.   Two clear pt belonging bags on [facility].   Transferred with mask in place.     Oxygen tank 50% full.

## 2022-04-06 ENCOUNTER — TELEPHONE (OUTPATIENT)
Dept: OBGYN | Facility: CLINIC | Age: 37
End: 2022-04-06
Payer: COMMERCIAL

## 2022-04-06 NOTE — TELEPHONE ENCOUNTER
Completed paperwork was given to Dr. Tompkins to sign.    -Melissa NATARAJAN [facility] [place]

## 2022-04-07 NOTE — TELEPHONE ENCOUNTER
Paperwork completed and signed by  faxed to [phone] and mailed to [address] [address] per pt request.  A copy is in file cabinet up front.    Ina Wagner   Clinic Station    [facility]  [phone]

## 2022-04-20 ENCOUNTER — OFFICE VISIT (OUTPATIENT)
Dept: FAMILY MEDICINE | Facility: CLINIC | Age: 37
End: 2022-04-20
Payer: COMMERCIAL

## 2022-04-20 VITALS
SYSTOLIC BLOOD PRESSURE: 118 MMHG | HEART RATE: 74 BPM | BODY MASS INDEX: 30.8 KG/M2 | HEIGHT: 62 IN | DIASTOLIC BLOOD PRESSURE: 70 MMHG | WEIGHT: 167.4 LBS | RESPIRATION RATE: 15 BRPM | TEMPERATURE: 98 F | OXYGEN SATURATION: 98 %

## 2022-04-20 DIAGNOSIS — Z01.818 PRE-OP EXAM: Primary | ICD-10-CM

## 2022-04-20 DIAGNOSIS — Z30.9 ENCOUNTER FOR CONTRACEPTIVE MANAGEMENT, UNSPECIFIED TYPE: ICD-10-CM

## 2022-04-20 PROCEDURE — 99214 OFFICE O/P EST MOD 30 MIN: CPT | Performed by: FAMILY MEDICINE

## 2022-04-20 ASSESSMENT — PAIN SCALES - GENERAL: PAINLEVEL: NO PAIN (0)

## 2022-04-20 NOTE — NURSING NOTE
"Chief Complaint   Patient presents with     Pre-Op Exam       Initial /70 (BP Location: Right arm, Patient Position: Chair, Cuff Size: Adult Regular)   Pulse 74   Temp 98  F (36.7  C) (Tympanic)   Resp 15   Ht 1.581 m (5' 2.25\")   Wt 75.9 kg (167 lb 6.4 oz)   LMP 03/28/2022 (Exact Date)   SpO2 98%   BMI 30.37 kg/m   Estimated body mass index is 30.37 kg/m  as calculated from the following:    Height as of this encounter: 1.581 m (5' 2.25\").    Weight as of this encounter: 75.9 kg (167 lb 6.4 oz).    Patient presents to the clinic using No DME    Health Maintenance that is potentially due pending provider review:  Will get Covid booster through work     Pt is already scheduled for covid 05/17/2022.    Is there anyone who you would like to be able to receive your results? No  If yes have patient fill out AUDREY  Lisa Gordon CMA    "

## 2022-04-20 NOTE — PROGRESS NOTES
[facility]  [address] [place] [id number]  Phone: [phone]  Fax: [phone]  Primary Provider: Joshua Wagner        PREOPERATIVE EVALUATION:  Today's date: 4/20/2022    Liat Gold is a 37 year old female who presents for a preoperative evaluation.    Surgical Information:  Surgery/Procedure:   SALPINGECTOMY, LAPAROSCOPIC Bilateral General   Removal of Vulvar Lesion         Surgery Location: [facility]  Surgeon: Dr Tompkins  Surgery Date: 05/04/2022  Time of Surgery: 10 am  Where patient plans to recover: At home with family  Fax number for surgical facility: Note does not need to be faxed, will be available electronically in Epic.    Type of Anesthesia Anticipated: General        Subjective     HPI related to upcoming procedure: desires sterilization.  On birth control patch right now.      Preop Questions 4/19/2022   1. Have you ever had a heart attack or stroke? No   2. Have you ever had surgery on your heart or blood vessels, such as a stent placement, a coronary artery bypass, or surgery on an artery in your head, neck, heart, or legs? No   3. Do you have chest pain with activity? No   4. Do you have a history of  heart failure? No   5. Do you currently have a cold, bronchitis or symptoms of other infection? No   6. Do you have a cough, shortness of breath, or wheezing? No   7. Do you or anyone in your family have previous history of blood clots? No   8. Do you or does anyone in your family have a serious bleeding problem such as prolonged bleeding following surgeries or cuts? No   9. Have you ever had problems with anemia or been told to take iron pills? No   10. Have you had any abnormal blood loss such as black, tarry or bloody stools, or abnormal vaginal bleeding? No   11. Have you ever had a blood transfusion? No   12. Are you willing to have a blood transfusion if it is medically needed before, during, or after your surgery? Yes   13. Have you or any of your  relatives ever had problems with anesthesia? No   14. Do you have sleep apnea, excessive snoring or daytime drowsiness? No   15. Do you have any artifical heart valves or other implanted medical devices like a pacemaker, defibrillator, or continuous glucose monitor? No   16. Do you have artificial joints? No   17. Are you allergic to latex? No   18. Is there any chance that you may be pregnant? No       Health Care Directive:  Patient does not have a Health Care Directive or Living Will:     Preoperative Review of :   reviewed - no record of controlled substances prescribed.          Review of Systems  Constitutional, neuro, ENT, endocrine, pulmonary, cardiac, gastrointestinal, genitourinary, musculoskeletal, integument and psychiatric systems are negative, except as otherwise noted.    Patient Active Problem List    Diagnosis Date Noted     Severe dysplasia of cervix (ALISSA III) 12/29/2006     Priority: High     Class: Acute     12/14/06 HSIL  12/29/06 Colpo ALISSA 3. Recolpo 3 months (pregnant).  2/3/09 ASCUS, + HPV 16,18 (neg other). Recommend re-colpo.  3/4/09 Colpo ALISSA 3. Recommend LEEP  4/8/09 LEEP ALISSA 3 with + margins. Repeat pap 6 months.  2/23/10 NIL pap. Repeat pap 6 months.  4/25/11 Lost to follow-up for pap tracking   2/28/12 NIL pap. Repeat pap 1 year.   Gap in care  2/10/21 NIL Pap, Neg HPV. Plan cotest in 1 year.   3/25/22 NIL pap, neg HPV. Plan: cotest in 1 year       Opiate addiction (H) 04/01/2015     Priority: Medium     Off methadone and any opioids around 2015.  Has been sober since.         Arthritis 09/26/2011     Priority: Medium     Lots of aches and pains.  Sed rate, lyme, hep C normal        CARDIOVASCULAR SCREENING; LDL GOAL LESS THAN 160 05/26/2011     Priority: Medium     Anxiety 04/20/2011     Priority: Medium     Smoker 02/03/2009     Priority: Medium     Encourage cessation       Restless legs 02/03/2009     Priority: Medium     Sinemet helps.         Female pelvic pain 02/03/2009  "    Priority: Medium      Past Medical History:   Diagnosis Date     ALISSA 3     LEEP     Irritable bowel syndrome      Past Surgical History:   Procedure Laterality Date     APPENDECTOMY  2007     APPENDECTOMY       BIOPSY CERVICAL, LOCAL EXCISION, SINGLE/MULTIPLE  2007     BIOPSY CERVICAL, LOCAL EXCISION, SINGLE/MULTIPLE        SECTION N/A 2017    Procedure:  SECTION;  Surgeon: Lulú Lee MD;  [facility]: [facility];  Service:      D & C  2009     LEEP TX, CERVICAL  2009    ALISSA 3     PELVIS LAPAROSCOPY,DX  x 2    abdominal pain     WISDOM TOOTH EXTRACTION       Current Outpatient Medications   Medication Sig Dispense Refill     ondansetron (ZOFRAN-ODT) 4 MG ODT tab DISSOLVE 1 TABLET IN MOUTH TWICE DAILY AS NEEDED       XULANE 150-35 MCG/24HR patch Place 1 patch onto the skin once a week Remove old patch and apply new patch onto the skin once a week for 3 weeks (21 days). Do not wear patch week 4 (days 22-28), then repeat. 8 patch 0     ** PATIENT ALERT ** DO NOT EXCEED 4,000 MG OF ACETAMINOPHEN FROM ALL SOURCES IN 24 HOURS (Patient not taking: No sig reported)         Allergies   Allergen Reactions     Codeine      Pcn [Penicillins]      \"doesn't work\"        Social History     Tobacco Use     Smoking status: Current Every Day Smoker     Packs/day: 0.50     Types: Cigarettes     Smokeless tobacco: Never Used     Tobacco comment: using E-Cig   Substance Use Topics     Alcohol use: No             Objective     /70 (BP Location: Right arm, Patient Position: Chair, Cuff Size: Adult Regular)   Pulse 74   Temp 98  F (36.7  C) (Tympanic)   Resp 15   Ht 1.581 m (5' 2.25\")   Wt 75.9 kg (167 lb 6.4 oz)   LMP 2022 (Exact Date)   SpO2 98%   BMI 30.37 kg/m      Physical Exam  Gen: alert and oriented, in no acute distress, affect within normal limits  Neck: supple with no masses or nodes  Throat: oropharynx clear, no exudate or tonsillar/palate " asymmetry.    CV: RRR, no murmur  Lungs: clear bilaterally with good effort  Abd: nontender, no mass  Ext: no edema or lesions   Neuro: moving all extremities, gait normal, no focal deficts noted      Diagnostics:  None indicated     Revised Cardiac Risk Index (RCRI):  The patient has the following serious cardiovascular risks for perioperative complications:   - No serious cardiac risks = 0 points     RCRI Interpretation: 0 points: Class I (very low risk - 0.4% complication rate)    Pre op  Sterilization desired  Tobacco use    Proceed.  Low risk.  She is on hormonal combined contraception via patch as only medication           Signed Electronically by: Joshua Wagner MD  Copy of this evaluation report is provided to requesting physician.

## 2022-04-20 NOTE — H&P (VIEW-ONLY)
[facility]  [address] [place] [id number]  Phone: [phone]  Fax: [phone]  Primary Provider: Joshua Wagner        PREOPERATIVE EVALUATION:  Today's date: 4/20/2022    Liat Gold is a 37 year old female who presents for a preoperative evaluation.    Surgical Information:  Surgery/Procedure:   SALPINGECTOMY, LAPAROSCOPIC Bilateral General   Removal of Vulvar Lesion         Surgery Location: [facility]  Surgeon: Dr Tompkins  Surgery Date: 05/04/2022  Time of Surgery: 10 am  Where patient plans to recover: At home with family  Fax number for surgical facility: Note does not need to be faxed, will be available electronically in Epic.    Type of Anesthesia Anticipated: General        Subjective     HPI related to upcoming procedure: desires sterilization.  On birth control patch right now.      Preop Questions 4/19/2022   1. Have you ever had a heart attack or stroke? No   2. Have you ever had surgery on your heart or blood vessels, such as a stent placement, a coronary artery bypass, or surgery on an artery in your head, neck, heart, or legs? No   3. Do you have chest pain with activity? No   4. Do you have a history of  heart failure? No   5. Do you currently have a cold, bronchitis or symptoms of other infection? No   6. Do you have a cough, shortness of breath, or wheezing? No   7. Do you or anyone in your family have previous history of blood clots? No   8. Do you or does anyone in your family have a serious bleeding problem such as prolonged bleeding following surgeries or cuts? No   9. Have you ever had problems with anemia or been told to take iron pills? No   10. Have you had any abnormal blood loss such as black, tarry or bloody stools, or abnormal vaginal bleeding? No   11. Have you ever had a blood transfusion? No   12. Are you willing to have a blood transfusion if it is medically needed before, during, or after your surgery? Yes   13. Have you or any of your  relatives ever had problems with anesthesia? No   14. Do you have sleep apnea, excessive snoring or daytime drowsiness? No   15. Do you have any artifical heart valves or other implanted medical devices like a pacemaker, defibrillator, or continuous glucose monitor? No   16. Do you have artificial joints? No   17. Are you allergic to latex? No   18. Is there any chance that you may be pregnant? No       Health Care Directive:  Patient does not have a Health Care Directive or Living Will:     Preoperative Review of :   reviewed - no record of controlled substances prescribed.          Review of Systems  Constitutional, neuro, ENT, endocrine, pulmonary, cardiac, gastrointestinal, genitourinary, musculoskeletal, integument and psychiatric systems are negative, except as otherwise noted.    Patient Active Problem List    Diagnosis Date Noted     Severe dysplasia of cervix (ALISSA III) 12/29/2006     Priority: High     Class: Acute     12/14/06 HSIL  12/29/06 Colpo ALISSA 3. Recolpo 3 months (pregnant).  2/3/09 ASCUS, + HPV 16,18 (neg other). Recommend re-colpo.  3/4/09 Colpo ALISSA 3. Recommend LEEP  4/8/09 LEEP ALISSA 3 with + margins. Repeat pap 6 months.  2/23/10 NIL pap. Repeat pap 6 months.  4/25/11 Lost to follow-up for pap tracking   2/28/12 NIL pap. Repeat pap 1 year.   Gap in care  2/10/21 NIL Pap, Neg HPV. Plan cotest in 1 year.   3/25/22 NIL pap, neg HPV. Plan: cotest in 1 year       Opiate addiction (H) 04/01/2015     Priority: Medium     Off methadone and any opioids around 2015.  Has been sober since.         Arthritis 09/26/2011     Priority: Medium     Lots of aches and pains.  Sed rate, lyme, hep C normal        CARDIOVASCULAR SCREENING; LDL GOAL LESS THAN 160 05/26/2011     Priority: Medium     Anxiety 04/20/2011     Priority: Medium     Smoker 02/03/2009     Priority: Medium     Encourage cessation       Restless legs 02/03/2009     Priority: Medium     Sinemet helps.         Female pelvic pain 02/03/2009  "    Priority: Medium      Past Medical History:   Diagnosis Date     ALISSA 3     LEEP     Irritable bowel syndrome      Past Surgical History:   Procedure Laterality Date     APPENDECTOMY  2007     APPENDECTOMY       BIOPSY CERVICAL, LOCAL EXCISION, SINGLE/MULTIPLE  2007     BIOPSY CERVICAL, LOCAL EXCISION, SINGLE/MULTIPLE        SECTION N/A 2017    Procedure:  SECTION;  Surgeon: Lulú Lee MD;  Location: [facility] L+D OR;  Service:      D & C  2009     LEEP TX, CERVICAL  2009    ALISSA 3     PELVIS LAPAROSCOPY,DX  x 2    abdominal pain     WISDOM TOOTH EXTRACTION       Current Outpatient Medications   Medication Sig Dispense Refill     ondansetron (ZOFRAN-ODT) 4 MG ODT tab DISSOLVE 1 TABLET IN MOUTH TWICE DAILY AS NEEDED       XULANE 150-35 MCG/24HR patch Place 1 patch onto the skin once a week Remove old patch and apply new patch onto the skin once a week for 3 weeks (21 days). Do not wear patch week 4 (days 22-28), then repeat. 8 patch 0     ** PATIENT ALERT ** DO NOT EXCEED 4,000 MG OF ACETAMINOPHEN FROM ALL SOURCES IN 24 HOURS (Patient not taking: No sig reported)         Allergies   Allergen Reactions     Codeine      Pcn [Penicillins]      \"doesn't work\"        Social History     Tobacco Use     Smoking status: Current Every Day Smoker     Packs/day: 0.50     Types: Cigarettes     Smokeless tobacco: Never Used     Tobacco comment: using E-Cig   Substance Use Topics     Alcohol use: No             Objective     /70 (BP Location: Right arm, Patient Position: Chair, Cuff Size: Adult Regular)   Pulse 74   Temp 98  F (36.7  C) (Tympanic)   Resp 15   Ht 1.581 m (5' 2.25\")   Wt 75.9 kg (167 lb 6.4 oz)   LMP 2022 (Exact Date)   SpO2 98%   BMI 30.37 kg/m      Physical Exam  Gen: alert and oriented, in no acute distress, affect within normal limits  Neck: supple with no masses or nodes  Throat: oropharynx clear, no exudate or tonsillar/palate " asymmetry.    CV: RRR, no murmur  Lungs: clear bilaterally with good effort  Abd: nontender, no mass  Ext: no edema or lesions   Neuro: moving all extremities, gait normal, no focal deficts noted      Diagnostics:  None indicated     Revised Cardiac Risk Index (RCRI):  The patient has the following serious cardiovascular risks for perioperative complications:   - No serious cardiac risks = 0 points     RCRI Interpretation: 0 points: Class I (very low risk - 0.4% complication rate)    Pre op  Sterilization desired  Tobacco use    Proceed.  Low risk.  She is on hormonal combined contraception via patch as only medication           Signed Electronically by: Joshua Wagner MD  Copy of this evaluation report is provided to requesting physician.

## 2022-05-03 ENCOUNTER — ANESTHESIA EVENT (OUTPATIENT)
Dept: SURGERY | Facility: CLINIC | Age: 37
End: 2022-05-03
Payer: COMMERCIAL

## 2022-05-03 ASSESSMENT — LIFESTYLE VARIABLES: TOBACCO_USE: 1

## 2022-05-03 NOTE — ANESTHESIA PREPROCEDURE EVALUATION
"Anesthesia Pre-Procedure Evaluation    Patient: Liat Gold   MRN: [id number] : 1985        Procedure : Procedure(s):  SALPINGECTOMY, LAPAROSCOPIC  Removal of Vulvar Lesion          Past Medical History:   Diagnosis Date     ALISSA 3     LEEP     Irritable bowel syndrome       Past Surgical History:   Procedure Laterality Date     APPENDECTOMY  2007     APPENDECTOMY       BIOPSY CERVICAL, LOCAL EXCISION, SINGLE/MULTIPLE  2007     BIOPSY CERVICAL, LOCAL EXCISION, SINGLE/MULTIPLE        SECTION N/A 2017    Procedure:  SECTION;  Surgeon: Lulú Lee MD;  Location: [facility] L+D OR;  Service:      D & C  2009     LEEP TX, CERVICAL  2009    ALISSA 3     PELVIS LAPAROSCOPY,DX  x 2    abdominal pain     WISDOM TOOTH EXTRACTION        Allergies   Allergen Reactions     Codeine      Pcn [Penicillins]      \"doesn't work\"      Social History     Tobacco Use     Smoking status: Current Every Day Smoker     Packs/day: 0.50     Types: Cigarettes     Smokeless tobacco: Never Used     Tobacco comment: using E-Cig   Substance Use Topics     Alcohol use: No      Wt Readings from Last 1 Encounters:   22 75.9 kg (167 lb 6.4 oz)        Anesthesia Evaluation            ROS/MED HX  ENT/Pulmonary:     (+) tobacco use, Current use, 0 packs/day, 15  Pack-Year Hx,      Neurologic: Comment: RLS - neg neurologic ROS     Cardiovascular:  - neg cardiovascular ROS     METS/Exercise Tolerance: >4 METS    Hematologic:  - neg hematologic  ROS     Musculoskeletal:   (+) arthritis,     GI/Hepatic:     (+) Inflammatory bowel disease,     Renal/Genitourinary:  - neg Renal ROS     Endo:  - neg endo ROS     Psychiatric/Substance Use: Comment: Hx opioid abuse, sober since     (+) psychiatric history anxiety H/O chronic opiod use .     Infectious Disease:  - neg infectious disease ROS     Malignancy:       Other:            Physical Exam    Airway        Mallampati: I   TM " distance: > 3 FB   Neck ROM: full   Mouth opening: > 3 cm    Respiratory Devices and Support         Dental       (+) upper dentures and lower dentures      Cardiovascular   cardiovascular exam normal          Pulmonary   pulmonary exam normal                OUTSIDE LABS:  CBC:   Lab Results   Component Value Date    WBC 9.6 02/28/2012    WBC 7.8 10/28/2009    HGB 12.3 02/28/2012    HGB 12.8 10/28/2009    HCT 37.3 02/28/2012    HCT 38.4 10/28/2009     02/28/2012     10/28/2009     BMP:   Lab Results   Component Value Date     12/26/2011     10/28/2009    POTASSIUM 3.9 12/26/2011    POTASSIUM 3.7 10/28/2009    CHLORIDE 111 12/26/2011    CHLORIDE 113 (H) 10/28/2009    CO2 23 10/28/2009    CO2 26 03/29/2009    BUN 9 12/26/2011    BUN 8 10/28/2009    CR 0.7 12/26/2011    CR 0.55 10/28/2009    GLC 96 12/26/2011     (H) 10/28/2009     COAGS: No results found for: PTT, INR, FIBR  POC:   Lab Results   Component Value Date    HCG Negative 02/10/2021     HEPATIC:   Lab Results   Component Value Date    ALBUMIN 4.1 12/26/2011    PROTTOTAL 8.0 12/26/2011    ALT <6 12/26/2011    AST 20 12/26/2011    ALKPHOS 81 12/26/2011    BILITOTAL 1.0 12/26/2011     OTHER:   Lab Results   Component Value Date    AMAURY 9.7 12/26/2011    LIPASE 66 10/28/2009    AMYLASE 92 01/28/2009    TSH 1.83 06/26/2014    SED 5 07/21/2011       Anesthesia Plan    ASA Status:  2   NPO Status:  NPO Appropriate    Anesthesia Type: General.     - Airway: ETT   Induction: Intravenous.           Consents    Anesthesia Plan(s) and associated risks, benefits, and realistic alternatives discussed. Questions answered and patient/representative(s) expressed understanding.    - Discussed:     - Discussed with:  Patient      - Extended Intubation/Ventilatory Support Discussed: No.      - Patient is DNR/DNI Status: No    Use of blood products discussed: No .     Postoperative Care    Pain management: IV analgesics, Oral pain medications,  Multi-modal analgesia.   PONV prophylaxis: Ondansetron (or other 5HT-3), Dexamethasone or Solumedrol, Background Propofol Infusion     Comments:                Jaya Killian CRNA, APRN CRNA

## 2022-05-04 ENCOUNTER — ANESTHESIA (OUTPATIENT)
Dept: SURGERY | Facility: CLINIC | Age: 37
End: 2022-05-04
Payer: COMMERCIAL

## 2022-05-04 ENCOUNTER — HOSPITAL ENCOUNTER (OUTPATIENT)
Facility: CLINIC | Age: 37
Discharge: HOME | End: 2022-05-04
Attending: OBSTETRICS & GYNECOLOGY | Admitting: OBSTETRICS & GYNECOLOGY
Payer: COMMERCIAL

## 2022-05-04 VITALS
WEIGHT: 167 LBS | TEMPERATURE: 98.2 F | HEART RATE: 62 BPM | HEIGHT: 62 IN | BODY MASS INDEX: 30.73 KG/M2 | OXYGEN SATURATION: 96 % | DIASTOLIC BLOOD PRESSURE: 73 MMHG | SYSTOLIC BLOOD PRESSURE: 107 MMHG | RESPIRATION RATE: 14 BRPM

## 2022-05-04 DIAGNOSIS — Z30.2 ENCOUNTER FOR STERILIZATION: ICD-10-CM

## 2022-05-04 LAB — HCG UR QL: NEGATIVE

## 2022-05-04 PROCEDURE — 258N000003 HC RX IP 258 OP 636: Performed by: NURSE ANESTHETIST, CERTIFIED REGISTERED

## 2022-05-04 PROCEDURE — 250N000013 HC RX MED GY IP 250 OP 250 PS 637: Performed by: NURSE ANESTHETIST, CERTIFIED REGISTERED

## 2022-05-04 PROCEDURE — 88305 TISSUE EXAM BY PATHOLOGIST: CPT | Mod: TC | Performed by: OBSTETRICS & GYNECOLOGY

## 2022-05-04 PROCEDURE — 272N000001 HC OR GENERAL SUPPLY STERILE: Performed by: OBSTETRICS & GYNECOLOGY

## 2022-05-04 PROCEDURE — 370N000017 HC ANESTHESIA TECHNICAL FEE, PER MIN: Performed by: OBSTETRICS & GYNECOLOGY

## 2022-05-04 PROCEDURE — 250N000009 HC RX 250: Performed by: NURSE ANESTHETIST, CERTIFIED REGISTERED

## 2022-05-04 PROCEDURE — 250N000011 HC RX IP 250 OP 636: Performed by: NURSE ANESTHETIST, CERTIFIED REGISTERED

## 2022-05-04 PROCEDURE — 250N000013 HC RX MED GY IP 250 OP 250 PS 637: Performed by: OBSTETRICS & GYNECOLOGY

## 2022-05-04 PROCEDURE — 58661 LAPAROSCOPY REMOVE ADNEXA: CPT | Performed by: OBSTETRICS & GYNECOLOGY

## 2022-05-04 PROCEDURE — 710N000009 HC RECOVERY PHASE 1, LEVEL 1, PER MIN: Performed by: OBSTETRICS & GYNECOLOGY

## 2022-05-04 PROCEDURE — 360N000076 HC SURGERY LEVEL 3, PER MIN: Performed by: OBSTETRICS & GYNECOLOGY

## 2022-05-04 PROCEDURE — 250N000025 HC SEVOFLURANE, PER MIN: Performed by: OBSTETRICS & GYNECOLOGY

## 2022-05-04 PROCEDURE — 710N000012 HC RECOVERY PHASE 2, PER MINUTE: Performed by: OBSTETRICS & GYNECOLOGY

## 2022-05-04 PROCEDURE — 81025 URINE PREGNANCY TEST: CPT | Performed by: OBSTETRICS & GYNECOLOGY

## 2022-05-04 PROCEDURE — 250N000009 HC RX 250: Performed by: OBSTETRICS & GYNECOLOGY

## 2022-05-04 PROCEDURE — 999N000141 HC STATISTIC PRE-PROCEDURE NURSING ASSESSMENT: Performed by: OBSTETRICS & GYNECOLOGY

## 2022-05-04 PROCEDURE — 11420 EXC H-F-NK-SP B9+MARG 0.5/<: CPT | Mod: 51 | Performed by: OBSTETRICS & GYNECOLOGY

## 2022-05-04 RX ORDER — PROPOFOL 10 MG/ML
INJECTION, EMULSION INTRAVENOUS PRN
Status: DISCONTINUED | OUTPATIENT
Start: 2022-05-04 | End: 2022-05-04

## 2022-05-04 RX ORDER — FENTANYL CITRATE 50 UG/ML
25 INJECTION, SOLUTION INTRAMUSCULAR; INTRAVENOUS
Status: DISCONTINUED | OUTPATIENT
Start: 2022-05-04 | End: 2022-05-04 | Stop reason: HOSPADM

## 2022-05-04 RX ORDER — OXYCODONE HYDROCHLORIDE 5 MG/1
5 TABLET ORAL
Status: CANCELLED | OUTPATIENT
Start: 2022-05-04

## 2022-05-04 RX ORDER — HYDROMORPHONE HCL IN WATER/PF 6 MG/30 ML
0.2 PATIENT CONTROLLED ANALGESIA SYRINGE INTRAVENOUS EVERY 5 MIN PRN
Status: DISCONTINUED | OUTPATIENT
Start: 2022-05-04 | End: 2022-05-04 | Stop reason: HOSPADM

## 2022-05-04 RX ORDER — ACETAMINOPHEN 325 MG/1
975 TABLET ORAL ONCE
Status: CANCELLED | OUTPATIENT
Start: 2022-05-04 | End: 2022-05-04

## 2022-05-04 RX ORDER — ONDANSETRON 4 MG/1
4 TABLET, ORALLY DISINTEGRATING ORAL EVERY 30 MIN PRN
Status: DISCONTINUED | OUTPATIENT
Start: 2022-05-04 | End: 2022-05-04 | Stop reason: HOSPADM

## 2022-05-04 RX ORDER — ACETAMINOPHEN 325 MG/1
325-650 TABLET ORAL EVERY 6 HOURS PRN
Qty: 50 TABLET | Refills: 0 | Status: SHIPPED | OUTPATIENT
Start: 2022-05-04

## 2022-05-04 RX ORDER — ONDANSETRON 2 MG/ML
4 INJECTION INTRAMUSCULAR; INTRAVENOUS EVERY 30 MIN PRN
Status: DISCONTINUED | OUTPATIENT
Start: 2022-05-04 | End: 2022-05-04 | Stop reason: HOSPADM

## 2022-05-04 RX ORDER — OXYCODONE HYDROCHLORIDE 5 MG/1
5-10 TABLET ORAL EVERY 4 HOURS PRN
Qty: 6 TABLET | Refills: 0 | Status: SHIPPED | OUTPATIENT
Start: 2022-05-04 | End: 2022-05-06

## 2022-05-04 RX ORDER — AMOXICILLIN 250 MG
1-2 CAPSULE ORAL 2 TIMES DAILY
Qty: 30 TABLET | Refills: 0 | Status: SHIPPED | OUTPATIENT
Start: 2022-05-04 | End: 2022-07-28

## 2022-05-04 RX ORDER — LIDOCAINE HYDROCHLORIDE 10 MG/ML
INJECTION, SOLUTION INFILTRATION; PERINEURAL PRN
Status: DISCONTINUED | OUTPATIENT
Start: 2022-05-04 | End: 2022-05-04 | Stop reason: HOSPADM

## 2022-05-04 RX ORDER — LIDOCAINE 40 MG/G
CREAM TOPICAL
Status: DISCONTINUED | OUTPATIENT
Start: 2022-05-04 | End: 2022-05-04 | Stop reason: HOSPADM

## 2022-05-04 RX ORDER — IBUPROFEN 400 MG/1
800 TABLET, FILM COATED ORAL ONCE
Status: CANCELLED | OUTPATIENT
Start: 2022-05-04 | End: 2022-05-04

## 2022-05-04 RX ORDER — MEPERIDINE HYDROCHLORIDE 25 MG/ML
12.5 INJECTION INTRAMUSCULAR; INTRAVENOUS; SUBCUTANEOUS
Status: DISCONTINUED | OUTPATIENT
Start: 2022-05-04 | End: 2022-05-04 | Stop reason: HOSPADM

## 2022-05-04 RX ORDER — KETAMINE HYDROCHLORIDE 10 MG/ML
INJECTION INTRAMUSCULAR; INTRAVENOUS PRN
Status: DISCONTINUED | OUTPATIENT
Start: 2022-05-04 | End: 2022-05-04

## 2022-05-04 RX ORDER — ACETAMINOPHEN 325 MG/1
975 TABLET ORAL ONCE
Status: DISCONTINUED | OUTPATIENT
Start: 2022-05-04 | End: 2022-05-04 | Stop reason: HOSPADM

## 2022-05-04 RX ORDER — ALBUTEROL SULFATE 90 UG/1
AEROSOL, METERED RESPIRATORY (INHALATION) PRN
Status: DISCONTINUED | OUTPATIENT
Start: 2022-05-04 | End: 2022-05-04

## 2022-05-04 RX ORDER — DEXAMETHASONE SODIUM PHOSPHATE 4 MG/ML
INJECTION, SOLUTION INTRA-ARTICULAR; INTRALESIONAL; INTRAMUSCULAR; INTRAVENOUS; SOFT TISSUE PRN
Status: DISCONTINUED | OUTPATIENT
Start: 2022-05-04 | End: 2022-05-04

## 2022-05-04 RX ORDER — IBUPROFEN 800 MG/1
800 TABLET, FILM COATED ORAL EVERY 6 HOURS PRN
Qty: 30 TABLET | Refills: 0 | Status: SHIPPED | OUTPATIENT
Start: 2022-05-04

## 2022-05-04 RX ORDER — ACETAMINOPHEN 325 MG/1
975 TABLET ORAL ONCE
Status: COMPLETED | OUTPATIENT
Start: 2022-05-04 | End: 2022-05-04

## 2022-05-04 RX ORDER — LIDOCAINE HYDROCHLORIDE 10 MG/ML
INJECTION, SOLUTION INFILTRATION; PERINEURAL PRN
Status: DISCONTINUED | OUTPATIENT
Start: 2022-05-04 | End: 2022-05-04

## 2022-05-04 RX ORDER — ALBUTEROL SULFATE 0.83 MG/ML
2.5 SOLUTION RESPIRATORY (INHALATION) EVERY 4 HOURS PRN
Status: DISCONTINUED | OUTPATIENT
Start: 2022-05-04 | End: 2022-05-04 | Stop reason: HOSPADM

## 2022-05-04 RX ORDER — PROPOFOL 10 MG/ML
INJECTION, EMULSION INTRAVENOUS CONTINUOUS PRN
Status: DISCONTINUED | OUTPATIENT
Start: 2022-05-04 | End: 2022-05-04

## 2022-05-04 RX ORDER — SODIUM CHLORIDE, SODIUM LACTATE, POTASSIUM CHLORIDE, CALCIUM CHLORIDE 600; 310; 30; 20 MG/100ML; MG/100ML; MG/100ML; MG/100ML
INJECTION, SOLUTION INTRAVENOUS CONTINUOUS
Status: DISCONTINUED | OUTPATIENT
Start: 2022-05-04 | End: 2022-05-04 | Stop reason: HOSPADM

## 2022-05-04 RX ORDER — MAGNESIUM SULFATE HEPTAHYDRATE 40 MG/ML
2 INJECTION, SOLUTION INTRAVENOUS ONCE
Status: COMPLETED | OUTPATIENT
Start: 2022-05-04 | End: 2022-05-04

## 2022-05-04 RX ORDER — BUPIVACAINE HYDROCHLORIDE AND EPINEPHRINE 2.5; 5 MG/ML; UG/ML
INJECTION, SOLUTION INFILTRATION; PERINEURAL PRN
Status: DISCONTINUED | OUTPATIENT
Start: 2022-05-04 | End: 2022-05-04 | Stop reason: HOSPADM

## 2022-05-04 RX ORDER — FENTANYL CITRATE 50 UG/ML
INJECTION, SOLUTION INTRAMUSCULAR; INTRAVENOUS PRN
Status: DISCONTINUED | OUTPATIENT
Start: 2022-05-04 | End: 2022-05-04

## 2022-05-04 RX ORDER — FENTANYL CITRATE 50 UG/ML
25 INJECTION, SOLUTION INTRAMUSCULAR; INTRAVENOUS EVERY 5 MIN PRN
Status: DISCONTINUED | OUTPATIENT
Start: 2022-05-04 | End: 2022-05-04 | Stop reason: HOSPADM

## 2022-05-04 RX ORDER — GABAPENTIN 300 MG/1
300 CAPSULE ORAL
Status: COMPLETED | OUTPATIENT
Start: 2022-05-04 | End: 2022-05-04

## 2022-05-04 RX ORDER — KETOROLAC TROMETHAMINE 15 MG/ML
15 INJECTION, SOLUTION INTRAMUSCULAR; INTRAVENOUS EVERY 6 HOURS PRN
Status: COMPLETED | OUTPATIENT
Start: 2022-05-04 | End: 2022-05-04

## 2022-05-04 RX ORDER — ONDANSETRON 2 MG/ML
INJECTION INTRAMUSCULAR; INTRAVENOUS PRN
Status: DISCONTINUED | OUTPATIENT
Start: 2022-05-04 | End: 2022-05-04

## 2022-05-04 RX ADMIN — FENTANYL CITRATE 25 MCG: 50 INJECTION, SOLUTION INTRAMUSCULAR; INTRAVENOUS at 12:53

## 2022-05-04 RX ADMIN — MAGNESIUM SULFATE HEPTAHYDRATE 2 G: 40 INJECTION, SOLUTION INTRAVENOUS at 11:32

## 2022-05-04 RX ADMIN — ALBUTEROL SULFATE 6 PUFF: 90 AEROSOL, METERED RESPIRATORY (INHALATION) at 11:52

## 2022-05-04 RX ADMIN — SODIUM CHLORIDE, POTASSIUM CHLORIDE, SODIUM LACTATE AND CALCIUM CHLORIDE: 600; 310; 30; 20 INJECTION, SOLUTION INTRAVENOUS at 10:59

## 2022-05-04 RX ADMIN — PROPOFOL 30 MCG/KG/MIN: 10 INJECTION, EMULSION INTRAVENOUS at 11:32

## 2022-05-04 RX ADMIN — FENTANYL CITRATE 25 MCG: 50 INJECTION, SOLUTION INTRAMUSCULAR; INTRAVENOUS at 13:45

## 2022-05-04 RX ADMIN — DEXAMETHASONE SODIUM PHOSPHATE 4 MG: 4 INJECTION, SOLUTION INTRA-ARTICULAR; INTRALESIONAL; INTRAMUSCULAR; INTRAVENOUS; SOFT TISSUE at 11:25

## 2022-05-04 RX ADMIN — SUGAMMADEX 200 MG: 100 INJECTION, SOLUTION INTRAVENOUS at 12:13

## 2022-05-04 RX ADMIN — ROCURONIUM BROMIDE 50 MG: 50 INJECTION, SOLUTION INTRAVENOUS at 11:25

## 2022-05-04 RX ADMIN — LIDOCAINE HYDROCHLORIDE 50 MG: 10 INJECTION, SOLUTION INFILTRATION; PERINEURAL at 11:24

## 2022-05-04 RX ADMIN — KETAMINE HYDROCHLORIDE 30 MG: 10 INJECTION, SOLUTION INTRAMUSCULAR; INTRAVENOUS at 11:24

## 2022-05-04 RX ADMIN — FENTANYL CITRATE 100 MCG: 50 INJECTION, SOLUTION INTRAMUSCULAR; INTRAVENOUS at 11:24

## 2022-05-04 RX ADMIN — FENTANYL CITRATE 25 MCG: 50 INJECTION, SOLUTION INTRAMUSCULAR; INTRAVENOUS at 13:30

## 2022-05-04 RX ADMIN — FENTANYL CITRATE 25 MCG: 50 INJECTION, SOLUTION INTRAMUSCULAR; INTRAVENOUS at 13:10

## 2022-05-04 RX ADMIN — ACETAMINOPHEN 975 MG: 325 TABLET ORAL at 10:55

## 2022-05-04 RX ADMIN — KETOROLAC TROMETHAMINE 15 MG: 15 INJECTION, SOLUTION INTRAMUSCULAR; INTRAVENOUS at 13:14

## 2022-05-04 RX ADMIN — GABAPENTIN 300 MG: 300 CAPSULE ORAL at 10:56

## 2022-05-04 RX ADMIN — LIDOCAINE HYDROCHLORIDE 0.3 ML: 10 INJECTION, SOLUTION EPIDURAL; INFILTRATION; INTRACAUDAL; PERINEURAL at 10:57

## 2022-05-04 RX ADMIN — PROPOFOL 150 MG: 10 INJECTION, EMULSION INTRAVENOUS at 11:25

## 2022-05-04 RX ADMIN — FENTANYL CITRATE 25 MCG: 50 INJECTION, SOLUTION INTRAMUSCULAR; INTRAVENOUS at 13:05

## 2022-05-04 RX ADMIN — MIDAZOLAM 2 MG: 1 INJECTION INTRAMUSCULAR; INTRAVENOUS at 11:20

## 2022-05-04 RX ADMIN — SODIUM CHLORIDE, POTASSIUM CHLORIDE, SODIUM LACTATE AND CALCIUM CHLORIDE: 600; 310; 30; 20 INJECTION, SOLUTION INTRAVENOUS at 13:08

## 2022-05-04 RX ADMIN — FENTANYL CITRATE 25 MCG: 50 INJECTION, SOLUTION INTRAMUSCULAR; INTRAVENOUS at 12:47

## 2022-05-04 RX ADMIN — ONDANSETRON 4 MG: 2 INJECTION INTRAMUSCULAR; INTRAVENOUS at 11:25

## 2022-05-04 NOTE — ANESTHESIA PROCEDURE NOTES
Airway       Patient location during procedure: OR       Procedure Start/Stop Times: 5/4/2022 11:29 AM  Staff -        CRNA: Abel Webber APRN CRNA       Performed By: CRNA  Consent for Airway        Urgency: elective  Indications and Patient Condition       Indications for airway management: taiwo-procedural       Induction type:intravenous       Mask difficulty assessment: 1 - vent by mask    Final Airway Details       Final airway type: endotracheal airway       Successful airway: ETT - single and Oral  Endotracheal Airway Details        ETT size (mm): 7.0       Cuffed: yes       Successful intubation technique: direct laryngoscopy       DL Blade Type: Randhawa 2       Grade View of Cords: 1       Adjucts: stylet       Position: Right       Measured from: lips       Secured at (cm): 20       Bite block used: None    Post intubation assessment        Placement verified by: capnometry, equal breath sounds and chest rise        Number of attempts at approach: 1       Number of other approaches attempted: 0       Secured with: silk tape       Ease of procedure: easy       Dentition: Intact and Unchanged    Medication(s) Administered   Medication Administration Time: 5/4/2022 11:29 AM

## 2022-05-04 NOTE — DISCHARGE INSTRUCTIONS
Same Day Surgery Discharge Instructions  Special Precautions After Surgery - Adult    It is not unusual to feel lightheaded or faint, up to 24 hours after surgery or while taking pain medication.  If you have these symptoms; sit for a few minutes before standing and have someone assist you when getting up.  You should rest and relax for the next 24 hours and must have someone stay with you for at least 24 hours after your discharge.  DO NOT DRIVE any vehicle or operate mechanical equipment for 24 hours following the end of your surgery.  DO NOT DRIVE while taking narcotic pain medications that have been prescribed by your physician.  If you had a limb operated on, you must be able to use it fully to drive.  DO NOT drink alcoholic beverages for 24 hours following surgery or while taking prescription pain medication.  Drink clear liquids (apple juice, ginger ale, broth, 7-Up, etc.).  Progress to your regular diet as you feel able.  Any questions call your physician and do not make important decisions for 24 hours.    Nausea and Vomiting: Nausea and vomiting can occur any time after receiving anesthesia. If you experience nausea and vomiting we encourage you to move to a clear liquid diet and advance your diet as tolerated. If nausea and vomiting do not improve within 12 hours please call the surgeon or present to the Emergency department.     Break-through Bleeding: If your experience bleeding from your surgical site apply pressure and additional dressing per nurse instruction. For simple problems such as a saturated dressing, you may need to reinforce the dressing with more gauze and tape and put slight pressure on the site. If bleeding does not subside contact the surgeon or present to the Emergency Department.    Post-op Infection: If you develop a fever of 100.4 or greater, have pus like drainage, redness, swelling or severe pain at the surgical site not alleviated with pain medications; please  contact the surgeon or present to the Emergency Department.   __________________________________________________________________________________________________________________________________  IMPORTANT NUMBERS:    [facility] [place]:  [phone], [phone]  Pharmacy:  [phone]  Same Day Surgery:  [phone], Monday - Thursday until 8:30 p.m., Fridays until 6:00 p.m.  Urgent Care:  [phone]  Nurse Advice Line:  [phone]                                                                         OB Clinic:  [phone]

## 2022-05-04 NOTE — ANESTHESIA CARE TRANSFER NOTE
Patient: Liat Gold    Procedure: Procedure(s):  SALPINGECTOMY, LAPAROSCOPIC  Removal of Vulvar Lesion       Diagnosis: Encounter for sterilization [Z30.2]  Diagnosis Additional Information: No value filed.    Anesthesia Type:   General     Note:    Oropharynx: oropharynx clear of all foreign objects and spontaneously breathing  Level of Consciousness: awake and drowsy  Oxygen Supplementation: room air    Independent Airway: airway patency satisfactory and stable  Dentition: dentition unchanged  Vital Signs Stable: post-procedure vital signs reviewed and stable  Report to RN Given: handoff report given  Patient transferred to: PACU    Handoff Report: Identifed the Patient, Identified the Reponsible Provider, Reviewed the pertinent medical history, Discussed the surgical course, Reviewed Intra-OP anesthesia mangement and issues during anesthesia, Set expectations for post-procedure period and Allowed opportunity for questions and acknowledgement of understanding      Vitals:  Vitals Value Taken Time   /56 05/04/22 1230   Temp     Pulse 86 05/04/22 1234   Resp 21 05/04/22 1234   SpO2 94 % 05/04/22 1234   Vitals shown include unvalidated device data.    Electronically Signed By: PRESTON Paul CRNA  May 4, 2022  12:35 PM

## 2022-05-04 NOTE — ANESTHESIA POSTPROCEDURE EVALUATION
Patient: Liat Gold    Procedure: Procedure(s):  SALPINGECTOMY, LAPAROSCOPIC  Removal of Vulvar Lesion       Anesthesia Type:  General    Note:  Disposition: Outpatient   Postop Pain Control: Uneventful            Sign Out: Well controlled pain   PONV: No   Neuro/Psych: Uneventful            Sign Out: Acceptable/Baseline neuro status   Airway/Respiratory: Uneventful            Sign Out: Acceptable/Baseline resp. status   CV/Hemodynamics: Uneventful            Sign Out: Acceptable CV status; No obvious hypovolemia; No obvious fluid overload   Other NRE: NONE   DID A NON-ROUTINE EVENT OCCUR? No           Last vitals:  Vitals Value Taken Time   /64 05/04/22 1315   Temp 36.8  C (98.3  F) 05/04/22 1300   Pulse 63 05/04/22 1320   Resp 9 05/04/22 1320   SpO2 91 % 05/04/22 1320   Vitals shown include unvalidated device data.    Electronically Signed By: PRESTON aPul CRNA  May 4, 2022  1:50 PM

## 2022-05-04 NOTE — INTERVAL H&P NOTE
"I have reviewed the surgical (or preoperative) H&P that is linked to this encounter, and examined the patient. There are no significant changes    Clinical Conditions Present on Arrival:  Clinically Significant Risk Factors Present on Admission                   # Obesity: Estimated body mass index is 30.3 kg/m  as calculated from the following:    Height as of this encounter: 1.581 m (5' 2.25\").    Weight as of this encounter: 75.8 kg (167 lb).       "

## 2022-05-04 NOTE — OP NOTE
DATE OF PROCEDURE:  2022       PREOPERATIVE DIAGNOSES:   1. Desires sterilization   2. Right labia majora lesion--favor fibroma      POSTOPERATIVE DIAGNOSES:     1. Desires sterilization   2. Right labia majora lesion--favor fibroma      PROCEDURE: Laparoscopic bilateral tubal sterilization by salpingectomy, removal of right vulvar lesion       SURGEON:  Sandra Tompkins MD       ANESTHESIA:  General endotracheal.       FRA SCORE:  1.       ESTIMATED BLOOD LOSS:  minimal       I's and O's:  Please see anesthesia records.       COMPLICATIONS:  None.       FINDINGS:  Normal uterus, tubes, ovaries.  Normal cervix.  7 mm fleshy pedunculated lesion arising from the right superior labia majora.       INDICATIONS: Liat Gold is a 37 year old  who desires permanent sterilization.  Reviewed risks, benefits and alternatives to Tubal Ligation including but not limited to bleeding, infection, damage to other organs, possible failure, risk of ectopic, and risk of regret.  Reviewed permanence of procedure.   Patient desires to proceed.   Additionally, the patient has had a fleshy protrusion from her right labia majora that she desires removed.  It has been irritating and gets in the way of her shaving.  Patient verified the lesion to me pre-operatively.        DESCRIPTION OF PROCEDURE:  The patient was brought to the operating room and general anesthesia was administered without difficulty.  She was prepped and draped in the normal sterile fashion in the dorsal lithotomy position.  Russell catheter was placed in the bladder.  A bivalve speculum was placed in the vagina and the anterior lip of the cervix was grasped with a single-tooth tenaculum.  A Peter cannula was placed within the cervix.  The speculum was removed.  The fleshy vulvar lesion was infiltrated with 0.25% marcaine with epinephrine.  It was elevated and incised with Metzenbaum scissors.  The wound was closed with 3-0 vicryl simple interrupted sutures x 3.   Hemostasis was noted.      Attention was turned to the abdomen where the infraumbilical fold was infiltrated with 0.25% Marcaine with epinephrine.  A 5 mm incision was made inside the umbilicus.  While tenting the abdominal wall, the Veress needle was carefully introduced and intra-abdominal placement was confirmed by use of a water-filled syringe and a drop in intra-abdominal pressure with insufflation of CO2 gas.  The abdomen was insufflated to 15 mmHg.  The Veress needle was removed and a 5 mm trocar was introduced.  Intra-abdominal placement was confirmed with the laparoscope.  There was no evidence of omental insufflation, bowel injury or major vascular injury.  Care was taken during the entire procedure to avoid injury to bowel, bladder, ureters and major vascular structures.  The patient was placed in Trendelenburg and the above findings were noted.  A 5 mm trocar was placed in the right and left lower quadrants under direct visualization with premedication of 0.25% Marcaine with epinephrine.  The fallopian tubes were followed out to the fimbriated ends bilaterally.  The mesosalpinx was sequentially desiccated and transected bilaterally with the LigaSure device.  The tubes were amputated at the cornua bilaterally.  The tubes were sent to pathology for further evaluation.   All instruments were removed from the abdomen and the abdomen was desufflated.  The skin was closed with 4-0 Monocryl in a simple buried interrupted fashion.       Attention was returned to the vaginal region.  The Russell catheter and all vaginal instruments were removed.      The patient tolerated the procedure well.  Sponge, lap and needle counts were correct x2.  I was present for the entire procedure.  The patient was taken to the recovery room in stable condition.            VARINDER ORDONEZ MD

## 2022-05-04 NOTE — BRIEF OP NOTE
[facility] Brief Operative Note    Pre-operative diagnosis: Encounter for sterilization [Z30.2]   Post-operative diagnosis desires sterilization, right labia majora vulvar lesion     Procedure: Procedure(s):  SALPINGECTOMY, LAPAROSCOPIC  Removal of Vulvar Lesion   Surgeon(s): Surgeon(s) and Role:     * Sandra Tompkins MD - Primary   Estimated blood loss: * No values recorded between 5/4/2022 11:40 AM and 5/4/2022 12:26 PM *    Specimens: ID Type Source Tests Collected by Time Destination   1 : Right Vulvar Lesion Tissue Perineum SURGICAL PATHOLOGY EXAM Sandra Tompkins MD 5/4/2022 11:45 AM    2 : Bilateral Fallopian Tubes Tissue Fallopian Tube, Bilateral SURGICAL PATHOLOGY EXAM Sandra Tompkins MD 5/4/2022 12:05 PM       Findings: Normal uterus, tubes, ovaries.  Normal cervix.  7 mm fleshy pedunculated lesion arising from the right superior labia majora.

## 2022-05-06 ENCOUNTER — TELEPHONE (OUTPATIENT)
Dept: OBGYN | Facility: CLINIC | Age: 37
End: 2022-05-06
Payer: COMMERCIAL

## 2022-05-06 DIAGNOSIS — Z30.2 ENCOUNTER FOR STERILIZATION: ICD-10-CM

## 2022-05-06 LAB
PATH REPORT.COMMENTS IMP SPEC: NORMAL
PATH REPORT.FINAL DX SPEC: NORMAL
PATH REPORT.GROSS SPEC: NORMAL
PATH REPORT.MICROSCOPIC SPEC OTHER STN: NORMAL
PATH REPORT.RELEVANT HX SPEC: NORMAL
PHOTO IMAGE: NORMAL

## 2022-05-06 PROCEDURE — 88305 TISSUE EXAM BY PATHOLOGIST: CPT | Mod: 26 | Performed by: PATHOLOGY

## 2022-05-06 PROCEDURE — 88302 TISSUE EXAM BY PATHOLOGIST: CPT | Mod: 26 | Performed by: PATHOLOGY

## 2022-05-06 RX ORDER — OXYCODONE HYDROCHLORIDE 5 MG/1
5-10 TABLET ORAL EVERY 4 HOURS PRN
Qty: 6 TABLET | Refills: 0 | Status: SHIPPED | OUTPATIENT
Start: 2022-05-06 | End: 2022-07-28

## 2022-05-06 NOTE — TELEPHONE ENCOUNTER
"Return call to patient.  Spoke with patient on the phone.    S-(situation): Patient reports \" I have developed a cold with a cough. I can't cough it out because of the pain in my abdomen.\" Patient reports last night checking for fever and was 99.0. This morning was 100.3 and now 98.7 after Tylenol and Ibuprofen. Patient reports using [employer] brand cough syrup without success. Patient reports using cough drops and throat lozenges without help. Patient reports unable to sleep because \" of all the coughing.\" Patient reports she is using a pillow for splinting when coughing. Patient reports pain when not coughing is 4-5/10 \" not that bad.\" Patient has 1 oxycodone left.    B-(background): 5/4/2022 Procedure(s):  SALPINGECTOMY, LAPAROSCOPIC  Removal of Vulvar Lesion    A-(assessment): POD #2, cough     R-(recommendations): Reassurance provided. Splinting the abdomen with a pillow reviewed when coughing. Reviewed different cough medications available over the counter. Reviewed with patient possibility of need for further evaluation in Family Practice for cough.     Will send to Dr. Tompkins to review and advise.  Thank you.    Dixie David   [facility]  RN      "

## 2022-05-06 NOTE — TELEPHONE ENCOUNTER
Reason for Call:  Other call back    Detailed comments: Pt had tubal surgery on Wed.  Today miserable - cough, fever 100.3,   Ibuprofen/Tylenol helping with fever.  Hasn't gotten any sleep because she can't cough because it hurts so bad.    Phone Number Patient can be reached at: Home number on file [phone] (home)    Best Time:     Can we leave a detailed message on this number? YES    Call taken on 5/6/2022 at 8:46 AM by Melissa Conley

## 2022-05-06 NOTE — TELEPHONE ENCOUNTER
Agree with advice given and rx sent to pharmacy.      If requires more beyond that, she will need appointment with us and PCP to evaluate her post-surgery and her cough    Sandra Tompkins M.D.

## 2022-05-09 NOTE — TELEPHONE ENCOUNTER
No fevers.  Cough is improving.    Patient feeling better.    Patient would like to return to work, needs paperwork completed.  She will send this over.  Fax number given.    Dixie David   [facility]  RN

## 2022-05-12 ENCOUNTER — TELEPHONE (OUTPATIENT)
Dept: OBGYN | Facility: CLINIC | Age: 37
End: 2022-05-12
Payer: COMMERCIAL

## 2022-05-12 NOTE — TELEPHONE ENCOUNTER
"Reason for Call:  Other call back    Detailed comments: Pt dropped off forms to return to work - wants to return with no restrictions \"I am feeling great\"  Forms started and given to Dr. Tompkins to complete.    Phone Number Patient can be reached at: Home number on file [phone] (home)    Best Time:     Can we leave a detailed message on this number? Not Applicable    Call taken on 5/12/2022 at 9:51 AM by Melissa Conley        "

## 2022-05-13 NOTE — TELEPHONE ENCOUNTER
Paperwork completed and signed by Dr. Tompkins  to return to work with no restrictions. Paperwork faxed to [phone] Per pt request.      Ina Wagner   Clinic Station    [facility]  [phone]

## 2022-07-28 ENCOUNTER — OFFICE VISIT (OUTPATIENT)
Dept: FAMILY MEDICINE | Facility: CLINIC | Age: 37
End: 2022-07-28
Payer: COMMERCIAL

## 2022-07-28 VITALS
BODY MASS INDEX: 29.55 KG/M2 | HEIGHT: 62 IN | RESPIRATION RATE: 20 BRPM | SYSTOLIC BLOOD PRESSURE: 114 MMHG | OXYGEN SATURATION: 100 % | HEART RATE: 83 BPM | TEMPERATURE: 99.5 F | DIASTOLIC BLOOD PRESSURE: 72 MMHG | WEIGHT: 160.6 LBS

## 2022-07-28 DIAGNOSIS — M79.672 LEFT FOOT PAIN: ICD-10-CM

## 2022-07-28 DIAGNOSIS — M76.72 PERONEAL TENDONITIS, LEFT: Primary | ICD-10-CM

## 2022-07-28 PROCEDURE — 99213 OFFICE O/P EST LOW 20 MIN: CPT | Performed by: FAMILY MEDICINE

## 2022-07-28 RX ORDER — AMOXICILLIN 250 MG
CAPSULE ORAL
Qty: 360 TABLET | Refills: 0 | Status: SHIPPED | OUTPATIENT
Start: 2022-07-28 | End: 2024-04-22

## 2022-07-28 ASSESSMENT — PAIN SCALES - GENERAL: PAINLEVEL: MODERATE PAIN (4)

## 2022-07-28 NOTE — PROGRESS NOTES
"S: Liat Gold is a 37 year old female with about 8 months of L ankle pain.  Side of ankle, worse when \"turning it in\" like a sprain.  OK to walk.  No redness or swelling or wounds.      Never had t his before.  No idea what started it.  Is on feet at work    O:/72 (BP Location: Right arm, Patient Position: Sitting, Cuff Size: Adult Regular)   Pulse 83   Temp 99.5  F (37.5  C) (Tympanic)   Resp 20   Ht 1.575 m (5' 2\")   Wt 72.8 kg (160 lb 9.6 oz)   LMP  (LMP Unknown)   SpO2 100%   Breastfeeding No   BMI 29.37 kg/m     GEN: Alert and oriented, in no acute distress  Tender lateral malleolus L foot.  Not not/red.    Normal gait.  No redness/swelling.      Xray: normal.  independently visualized     A: peroneal tendonitis    P: better footwear.  nsaids for a week to settle any ongoing inflammation down.   She will look up exercises, has brace she can wear    Modify activity as much as possible    If not settling down, may need boot for a few weeks.  She knows this.  Will f/u prn.    "

## 2023-03-07 ENCOUNTER — PATIENT OUTREACH (OUTPATIENT)
Dept: OBGYN | Facility: CLINIC | Age: 38
End: 2023-03-07
Payer: COMMERCIAL

## 2023-03-07 DIAGNOSIS — D06.9 SEVERE DYSPLASIA OF CERVIX (CIN III): ICD-10-CM

## 2023-03-07 NOTE — LETTER
March 7, 2023      Liat Gold  [address] [facility] [address]        Dear MsNyasiaAsif,    This letter is to remind you that you are due for your follow-up Pap smear and Human Papillomavirus (HPV) test.    Please call [phone] to schedule your appointment at your earliest convenience.    If you have completed the appointment outside of the [facility] system, please have the records forwarded to our office. We will update your chart for your provider to review before your next annual wellness visit.     Thank you for choosing [facility]!      Sincerely,    Your [facility] Care Team

## 2023-04-25 ENCOUNTER — VIRTUAL VISIT (OUTPATIENT)
Dept: FAMILY MEDICINE | Facility: CLINIC | Age: 38
End: 2023-04-25
Payer: COMMERCIAL

## 2023-04-25 DIAGNOSIS — R11.0 NAUSEA: ICD-10-CM

## 2023-04-25 DIAGNOSIS — F43.21 GRIEF REACTION: Primary | ICD-10-CM

## 2023-04-25 PROCEDURE — 99213 OFFICE O/P EST LOW 20 MIN: CPT | Mod: VID | Performed by: NURSE PRACTITIONER

## 2023-04-25 RX ORDER — ONDANSETRON 4 MG/1
TABLET, ORALLY DISINTEGRATING ORAL
Qty: 30 TABLET | Refills: 1 | Status: SHIPPED | OUTPATIENT
Start: 2023-04-25 | End: 2023-10-30

## 2023-04-25 NOTE — PROGRESS NOTES
"Liat is a 38 year old who is being evaluated via a billable video visit.      How would you like to obtain your AVS? Mail a copy  If the video visit is dropped, the invitation should be resent by: Text to cell phone: [phone]  Will anyone else be joining your video visit? No          Assessment & Plan     Grief reaction  Patient just recently lost her son's father in an accident approximate 2 months ago.  Patient having increased anxiety and panic, especially around the grief of her son and his safety.  Patient is distraught.  Is currently in counseling for sobriety.  Encouraged to check into counseling for grief.  Patient also interested in support groups, advised patient to research nearby support groups, and provider will research as well.  Discussed encouraging some in counseling and setting up monitoring parameters at home to make patient feel more comfortable leaving for work.  Patient may have to stay home from work to monitor her son and or secondary to mental health.  FMLA paperwork will be completed for 3 days a month intermittent leave.  Patient to follow-up in 6 months or sooner.    Nausea  Ongoing, chronic nausea.  Patient has had for a long time.  Feels is from the zinc from the glue for her dentures.  Uses approximately 1-2 times weekly.  We will continue without any changes.  - ondansetron (ZOFRAN ODT) 4 MG ODT tab; DISSOLVE 1 TABLET IN MOUTH TWICE DAILY AS NEEDED           Nicotine/Tobacco Cessation:  She reports that she has been smoking cigarettes. She has been smoking an average of .5 packs per day. She has never used smokeless tobacco.  Nicotine/Tobacco Cessation Plan:   Not addressed today      BMI:   Estimated body mass index is 29.37 kg/m  as calculated from the following:    Height as of 7/28/22: 1.575 m (5' 2\").    Weight as of 7/28/22: 72.8 kg (160 lb 9.6 oz).   Weight management plan: Discussed healthy diet and exercise guidelines    See Patient Instructions    Alexia Diaz, " DNP, APRN-CNP   [facility]    Yulissa Josue is a 38 year old, presenting for the following health issues:  Forms (FMLA/Condition commenced: 02/05/2023 for [employer]), Grief (Partner passed on 2/5/2023), and Refill Request (Refill Zofran)         View : No data to display.              HPI   Grief  Partner passed on 2/5/2023  Does counseling for her sobriety - currently every 2 weeks         Medication Followup of Zofran    Taking Medication as prescribed: yes, prn    Side Effects:  None    Medication Helping Symptoms:  yes    Uses 1-2 times/weekly  Thinks from the zinc from glue from dentures    Review of Systems   Constitutional, HEENT, cardiovascular, pulmonary, gi and gu systems are negative, except as otherwise noted.      Objective           Vitals:  No vitals were obtained today due to virtual visit.    Physical Exam   GENERAL: Healthy, alert and no distress  EYES: Eyes grossly normal to inspection.  No discharge or erythema, or obvious scleral/conjunctival abnormalities.  RESP: No audible wheeze, cough, or visible cyanosis.  No visible retractions or increased work of breathing.    SKIN: Visible skin clear. No significant rash, abnormal pigmentation or lesions.  NEURO: Cranial nerves grossly intact.  Mentation and speech appropriate for age.  PSYCH: mentation appears normal and tearful    Diagnostic Test Results:  none            Video-Visit Details    Type of service:  Video Visit     Originating Location (pt. Location): Home    Distant Location (provider location):  Off-site  Platform used for Video Visit: [employer]    Chart documentation with Dragon Voice recognition Software. Although reviewed after completion, some words and grammatical errors may remain.

## 2023-04-25 NOTE — PATIENT INSTRUCTIONS
Grief reaction  Patient just recently lost her son's father in an accident approximate 2 months ago.  Patient having increased anxiety and panic, especially around the grief of her son and his safety.  Patient is distraught.  Is currently in counseling for sobriety.  Encouraged to check into counseling for grief.  Patient also interested in support groups, advised to research nearby support groups and provider will research as well.  Discussed encouraging some in counseling and setting up monitoring parameters at home to make patient feel more comfortable leaving for work.  Patient may have to stay home from work to monitor her son and or secondary to mental health.  [facility] paperwork will be completed for 3 days a month intermittent leave.  Patient to follow-up in 6 months or sooner.    Nausea  Ongoing, chronic nausea.  Patient has had for a long time.  Feels is from the zinc from the glue for her dentures.  Uses approximately 1-2 times weekly.  We will continue without any changes.  - ondansetron (ZOFRAN ODT) 4 MG ODT tab; DISSOLVE 1 TABLET IN MOUTH TWICE DAILY AS NEEDED

## 2023-04-26 ENCOUNTER — TELEPHONE (OUTPATIENT)
Dept: FAMILY MEDICINE | Facility: CLINIC | Age: 38
End: 2023-04-26
Payer: COMMERCIAL

## 2023-04-26 NOTE — TELEPHONE ENCOUNTER
Patient would like papers faxed to [facility] Leave , fax [phone]. Patient says she does not need a copy. Citlali Paul on 4/26/2023 at 9:08 AM

## 2023-04-26 NOTE — TELEPHONE ENCOUNTER
FMLA forms completed by Alexia. Called and left message for pt to call clinic back, does she want to  forms in clinic, or mail or fax it somewhere?    Adalgisa Boogie Patient

## 2023-05-04 NOTE — TELEPHONE ENCOUNTER
FYI to provider - Patient is lost to pap tracking follow-up. Attempts to contact pt have been made per reminder process and there has been no reply and/or no appt scheduled. Contact hx listed below.     12/14/06 HSIL  12/29/06 Colpo ALISSA 3. Recolpo 3 months (pregnant).  2/3/09 ASCUS, + HPV 16,18 (neg other). Recommend re-colpo.  3/4/09 Colpo ALISSA 3. Recommend LEEP  4/8/09 LEEP ALISSA 3 with + margins. Repeat pap 6 months.  2/23/10 NIL pap. Repeat pap 6 months.  4/25/11 Lost to follow-up for pap tracking   2/28/12 NIL pap. Repeat pap 1 year.   Gap in care  2/10/21 NIL Pap, Neg HPV. Plan cotest in 1 year.   3/25/22 NIL pap, neg HPV. Plan: cotest in 1 year  3/7/23 Reminder letter  4/10/23 Reminder call -- left message  5/4/23 Lost to follow-up for pap tracking     Chastity Lee RN BSN, Pap Tracking

## 2023-10-30 DIAGNOSIS — R11.0 NAUSEA: ICD-10-CM

## 2023-10-30 RX ORDER — ONDANSETRON 4 MG/1
TABLET, ORALLY DISINTEGRATING ORAL
Qty: 30 TABLET | Refills: 1 | Status: SHIPPED | OUTPATIENT
Start: 2023-10-30 | End: 2024-02-24

## 2023-12-05 ENCOUNTER — VIRTUAL VISIT (OUTPATIENT)
Dept: FAMILY MEDICINE | Facility: CLINIC | Age: 38
End: 2023-12-05
Payer: COMMERCIAL

## 2023-12-05 DIAGNOSIS — F41.9 ANXIETY: ICD-10-CM

## 2023-12-05 DIAGNOSIS — F43.21 GRIEF REACTION: Primary | ICD-10-CM

## 2023-12-05 PROCEDURE — 99213 OFFICE O/P EST LOW 20 MIN: CPT | Mod: 95 | Performed by: NURSE PRACTITIONER

## 2023-12-05 ASSESSMENT — ENCOUNTER SYMPTOMS: NERVOUS/ANXIOUS: 1

## 2023-12-05 NOTE — PROGRESS NOTES
Liat is a 38 year old who is being evaluated via a billable telephone visit.      What phone number would you like to be contacted at? [phone]   How would you like to obtain your AVS? Mail a copy    Distant Location (provider location):  Off-site    9:59 AM - 10:29 AM     Assessment & Plan     Grief reaction  Anxiety  Patient struggling with significant grief, intermittently in addition to anxiety for the last several months after the loss of her son's father earlier this year.  Has good days and bad days, however her bad days can get her down for a couple days at a time.  Struggling to work at times.  Having difficulty with her sons coping with his father's loss.  Has had to miss work due to grief and anxiety.  Previous FMLA forms completed.  Patient is currently not in any grief counseling, is interested in counseling and/or support groups.  Did discuss this at length.  Highly encouraged grief counseling and reviewed grief support groups around the area.  Patient would like to avoid medication if at all possible, however did discuss the benefits in the interim patient is to notify provider if wanting to pursue.  Will complete new, updated FMLA paperwork for time is to work and going forward for intermittently.  Patient to get forms to clinic.               See Patient Instructions    Alexia Diaz, VERÓNICA, APRN-CNP   [facility]    Subjective   Liat is a 38 year old, presenting for the following health issues:  Forms and Anxiety        12/5/2023     9:44 AM   Additional Questions   Roomed by Katie Bañuelos - Grief and anxiety  Onset: since death of spouse  Description: patient is very concerned about her son and when he feels anxiety then she does  Intensity: moderate  Progression of Symptoms:  intermittent  Accompanying Signs & Symptoms:   Previous history of similar problem:   Precipitating factors:        Worsened by: sons reaction to his grief  Alleviating factors:         Improved by: 0  Therapies tried and outcome:  none   Patient prefers not to take medication. Patient thinks it would be beneficial to take a leave. She could bring the FMLA forms in for completion.     Gets worried about her son, then gets so much anxiety and lasts 2-3 days   Had took a week and a half off in November     Review of Systems   Psychiatric/Behavioral:  The patient is nervous/anxious.       Constitutional, HEENT, cardiovascular, pulmonary, gi and gu systems are negative, except as otherwise noted.      Objective           Vitals:  No vitals were obtained today due to virtual visit.    Physical Exam   healthy, alert, no distress, and crying  PSYCH: Alert and oriented times 3; coherent speech, normal   rate and volume, able to articulate logical thoughts, able   to abstract reason, no tangential thoughts, no hallucinations   or delusions  Her affect is tearful and sad  RESP: No cough, no audible wheezing, able to talk in full sentences  Remainder of exam unable to be completed due to telephone visits    Diagnostic Test Results:  none            Phone call duration: 30 minutes    Chart documentation with Dragon Voice recognition Software. Although reviewed after completion, some words and grammatical errors may remain.

## 2023-12-06 NOTE — PATIENT INSTRUCTIONS
Grief reaction  Anxiety  Patient struggling with significant grief, intermittently in addition to anxiety for the last several months after the loss of her son's father earlier this year.  Has good days and bad days, however her bad days can get her down for a couple days at a time.  Struggling to work at times.  Having difficulty with her sons coping with his father's loss.  Has had to miss work due to grief and anxiety.  Previous FMLA forms completed.  Patient is currently not in any grief counseling, is interested in counseling and/or support groups.  Did discuss this at length.  Highly encouraged grief counseling and reviewed grief support groups around the area.  Patient would like to avoid medication if at all possible, however did discuss the benefits in the interim patient is to notify provider if wanting to pursue.  Will complete new, updated FMLA paperwork for time is to work and going forward for intermittently.  Patient to get forms to clinic.

## 2023-12-11 ENCOUNTER — TELEPHONE (OUTPATIENT)
Dept: FAMILY MEDICINE | Facility: CLINIC | Age: 38
End: 2023-12-11
Payer: COMMERCIAL

## 2023-12-15 NOTE — TELEPHONE ENCOUNTER
Forms completed and signed by Alexia. Faxed back. Copies made, sent to scanning. Originals mailed back to pt per pt's request.    Adalgisa Boogie Patient

## 2024-02-01 NOTE — TELEPHONE ENCOUNTER
See below.    Pt is calling back and need this [facility] paperwork form changed to:  From Nov. 11-27 and to say  Episodes may last longer than 2-3 per week.    (Pt states on page 2)  Then once completed fax to that original number and let pt know that it was done and would like a revised copy.  Her number is [phone]    Sima Whelan  [facility] Float

## 2024-02-09 NOTE — TELEPHONE ENCOUNTER
Form received back signed  2/9/24   Faxed Back & Sent to be scanned to this encounter  Mailed original to ptNyasia Schmitz [place] Sec

## 2024-02-12 ENCOUNTER — TELEPHONE (OUTPATIENT)
Dept: FAMILY MEDICINE | Facility: CLINIC | Age: 39
End: 2024-02-12
Payer: COMMERCIAL

## 2024-02-12 NOTE — TELEPHONE ENCOUNTER
FMLA forms were returned for corrections by Alexia Durán.  Faxed to [phone]  Copy to scan and copy mailed to patient.

## 2024-02-24 DIAGNOSIS — R11.0 NAUSEA: ICD-10-CM

## 2024-02-26 RX ORDER — ONDANSETRON 4 MG/1
TABLET, ORALLY DISINTEGRATING ORAL
Qty: 30 TABLET | Refills: 1 | Status: SHIPPED | OUTPATIENT
Start: 2024-02-26

## 2024-04-22 ENCOUNTER — TELEPHONE (OUTPATIENT)
Dept: FAMILY MEDICINE | Facility: CLINIC | Age: 39
End: 2024-04-22
Payer: COMMERCIAL

## 2024-04-22 DIAGNOSIS — K59.00 CONSTIPATION: Primary | ICD-10-CM

## 2024-04-22 RX ORDER — AMOXICILLIN 250 MG
CAPSULE ORAL
Qty: 120 TABLET | Refills: 0 | Status: SHIPPED | OUTPATIENT
Start: 2024-04-22

## 2024-04-22 NOTE — TELEPHONE ENCOUNTER
Medication Question or Refill    Contacts         Type Contact Phone/Fax    04/22/2024 08:27 AM CDT Phone (Incoming) Liat Gold (Self) [phone] (H)            What medication are you calling about (include dose and sig)?: Senna    Preferred Pharmacy:   [facility] [address] [place] [address] [address] [address] [address] [facility] [address]  Phone: [phone] Fax: [phone]      Controlled Substance Agreement on file:   CSA -- Patient Level:    CSA: None found at the patient level.       Who prescribed the medication?: Wagner     Do you need a refill? Yes    When did you use the medication last? This past week. Has had a lot of constipation again lately.     Patient offered an appointment? Yes: will schedule one if she needs to    Do you have any questions or concerns?  No      Okay to leave a detailed message?: Yes at Home number on file [phone] (home)

## 2024-07-11 ENCOUNTER — ANESTHESIA EVENT (OUTPATIENT)
Dept: SURGERY | Facility: CLINIC | Age: 39
End: 2024-07-11
Payer: COMMERCIAL

## 2024-07-11 ENCOUNTER — HOSPITAL ENCOUNTER (EMERGENCY)
Facility: CLINIC | Age: 39
Discharge: HOME | End: 2024-07-11
Attending: FAMILY MEDICINE | Admitting: FAMILY MEDICINE
Payer: COMMERCIAL

## 2024-07-11 VITALS
RESPIRATION RATE: 20 BRPM | TEMPERATURE: 98 F | SYSTOLIC BLOOD PRESSURE: 98 MMHG | OXYGEN SATURATION: 98 % | HEART RATE: 50 BPM | DIASTOLIC BLOOD PRESSURE: 89 MMHG

## 2024-07-11 DIAGNOSIS — K80.50 BILIARY COLIC: ICD-10-CM

## 2024-07-11 DIAGNOSIS — K80.20 SYMPTOMATIC CHOLELITHIASIS: Primary | ICD-10-CM

## 2024-07-11 LAB
ALBUMIN SERPL BCG-MCNC: 3.8 G/DL (ref 3.5–5.2)
ALP SERPL-CCNC: 81 U/L (ref 40–150)
ALT SERPL W P-5'-P-CCNC: 10 U/L (ref 0–50)
ANION GAP SERPL CALCULATED.3IONS-SCNC: 9 MMOL/L (ref 7–15)
AST SERPL W P-5'-P-CCNC: 21 U/L (ref 0–45)
BASOPHILS # BLD AUTO: 0.1 10E3/UL (ref 0–0.2)
BASOPHILS NFR BLD AUTO: 1 %
BILIRUB SERPL-MCNC: 0.4 MG/DL
BUN SERPL-MCNC: 7.5 MG/DL (ref 6–20)
CALCIUM SERPL-MCNC: 8.7 MG/DL (ref 8.6–10)
CHLORIDE SERPL-SCNC: 104 MMOL/L (ref 98–107)
CREAT SERPL-MCNC: 0.8 MG/DL (ref 0.51–0.95)
DEPRECATED HCO3 PLAS-SCNC: 24 MMOL/L (ref 22–29)
EGFRCR SERPLBLD CKD-EPI 2021: >90 ML/MIN/1.73M2
EOSINOPHIL # BLD AUTO: 0.2 10E3/UL (ref 0–0.7)
EOSINOPHIL NFR BLD AUTO: 2 %
ERYTHROCYTE [DISTWIDTH] IN BLOOD BY AUTOMATED COUNT: 12.6 % (ref 10–15)
GLUCOSE SERPL-MCNC: 95 MG/DL (ref 70–99)
HCG SERPL QL: NEGATIVE
HCT VFR BLD AUTO: 38.3 % (ref 35–47)
HGB BLD-MCNC: 13 G/DL (ref 11.7–15.7)
HOLD SPECIMEN: NORMAL
IMM GRANULOCYTES # BLD: 0.1 10E3/UL
IMM GRANULOCYTES NFR BLD: 1 %
LIPASE SERPL-CCNC: 15 U/L (ref 13–60)
LYMPHOCYTES # BLD AUTO: 3 10E3/UL (ref 0.8–5.3)
LYMPHOCYTES NFR BLD AUTO: 29 %
MCH RBC QN AUTO: 30.7 PG (ref 26.5–33)
MCHC RBC AUTO-ENTMCNC: 33.9 G/DL (ref 31.5–36.5)
MCV RBC AUTO: 91 FL (ref 78–100)
MONOCYTES # BLD AUTO: 0.7 10E3/UL (ref 0–1.3)
MONOCYTES NFR BLD AUTO: 6 %
NEUTROPHILS # BLD AUTO: 6.2 10E3/UL (ref 1.6–8.3)
NEUTROPHILS NFR BLD AUTO: 61 %
NRBC # BLD AUTO: 0 10E3/UL
NRBC BLD AUTO-RTO: 0 /100
PLATELET # BLD AUTO: 312 10E3/UL (ref 150–450)
POTASSIUM SERPL-SCNC: 3.6 MMOL/L (ref 3.4–5.3)
PROT SERPL-MCNC: 7.2 G/DL (ref 6.4–8.3)
RBC # BLD AUTO: 4.23 10E6/UL (ref 3.8–5.2)
SODIUM SERPL-SCNC: 137 MMOL/L (ref 135–145)
WBC # BLD AUTO: 10.3 10E3/UL (ref 4–11)

## 2024-07-11 PROCEDURE — 99284 EMERGENCY DEPT VISIT MOD MDM: CPT | Mod: 25

## 2024-07-11 PROCEDURE — 96376 TX/PRO/DX INJ SAME DRUG ADON: CPT

## 2024-07-11 PROCEDURE — 96366 THER/PROPH/DIAG IV INF ADDON: CPT

## 2024-07-11 PROCEDURE — 80053 COMPREHEN METABOLIC PANEL: CPT | Performed by: FAMILY MEDICINE

## 2024-07-11 PROCEDURE — 85025 COMPLETE CBC W/AUTO DIFF WBC: CPT | Performed by: FAMILY MEDICINE

## 2024-07-11 PROCEDURE — 96375 TX/PRO/DX INJ NEW DRUG ADDON: CPT

## 2024-07-11 PROCEDURE — 250N000013 HC RX MED GY IP 250 OP 250 PS 637: Performed by: FAMILY MEDICINE

## 2024-07-11 PROCEDURE — 99204 OFFICE O/P NEW MOD 45 MIN: CPT | Mod: 57 | Performed by: SURGERY

## 2024-07-11 PROCEDURE — 99284 EMERGENCY DEPT VISIT MOD MDM: CPT | Performed by: FAMILY MEDICINE

## 2024-07-11 PROCEDURE — 96365 THER/PROPH/DIAG IV INF INIT: CPT

## 2024-07-11 PROCEDURE — 83690 ASSAY OF LIPASE: CPT | Performed by: FAMILY MEDICINE

## 2024-07-11 PROCEDURE — 84703 CHORIONIC GONADOTROPIN ASSAY: CPT | Performed by: FAMILY MEDICINE

## 2024-07-11 PROCEDURE — 36415 COLL VENOUS BLD VENIPUNCTURE: CPT | Performed by: FAMILY MEDICINE

## 2024-07-11 PROCEDURE — 250N000011 HC RX IP 250 OP 636: Performed by: FAMILY MEDICINE

## 2024-07-11 RX ORDER — HYDROMORPHONE HYDROCHLORIDE 2 MG/1
2 TABLET ORAL EVERY 4 HOURS PRN
Status: DISCONTINUED | OUTPATIENT
Start: 2024-07-11 | End: 2024-07-11 | Stop reason: HOSPADM

## 2024-07-11 RX ORDER — ONDANSETRON 2 MG/ML
4 INJECTION INTRAMUSCULAR; INTRAVENOUS ONCE
Status: COMPLETED | OUTPATIENT
Start: 2024-07-11 | End: 2024-07-11

## 2024-07-11 RX ORDER — KETOROLAC TROMETHAMINE 15 MG/ML
15 INJECTION, SOLUTION INTRAMUSCULAR; INTRAVENOUS ONCE
Status: COMPLETED | OUTPATIENT
Start: 2024-07-11 | End: 2024-07-11

## 2024-07-11 RX ORDER — ONDANSETRON 4 MG/1
4 TABLET, ORALLY DISINTEGRATING ORAL EVERY 8 HOURS PRN
Qty: 10 TABLET | Refills: 0 | Status: SHIPPED | OUTPATIENT
Start: 2024-07-11

## 2024-07-11 RX ORDER — HYDROMORPHONE HYDROCHLORIDE 2 MG/1
2-4 TABLET ORAL EVERY 6 HOURS PRN
Qty: 10 TABLET | Refills: 0 | Status: SHIPPED | OUTPATIENT
Start: 2024-07-11 | End: 2024-07-14

## 2024-07-11 RX ORDER — ACETAMINOPHEN 500 MG
1000 TABLET ORAL ONCE
Status: COMPLETED | OUTPATIENT
Start: 2024-07-11 | End: 2024-07-11

## 2024-07-11 RX ORDER — DEXTROSE, SODIUM CHLORIDE, SODIUM LACTATE, POTASSIUM CHLORIDE, AND CALCIUM CHLORIDE 5; .6; .31; .03; .02 G/100ML; G/100ML; G/100ML; G/100ML; G/100ML
1000 INJECTION, SOLUTION INTRAVENOUS ONCE
Status: COMPLETED | OUTPATIENT
Start: 2024-07-11 | End: 2024-07-11

## 2024-07-11 RX ADMIN — KETOROLAC TROMETHAMINE 15 MG: 15 INJECTION, SOLUTION INTRAMUSCULAR; INTRAVENOUS at 10:27

## 2024-07-11 RX ADMIN — ACETAMINOPHEN 1000 MG: 500 TABLET, FILM COATED ORAL at 08:59

## 2024-07-11 RX ADMIN — SODIUM CHLORIDE, SODIUM LACTATE, POTASSIUM CHLORIDE, CALCIUM CHLORIDE AND DEXTROSE MONOHYDRATE 1000 ML: 5; 600; 310; 30; 20 INJECTION, SOLUTION INTRAVENOUS at 08:10

## 2024-07-11 RX ADMIN — ONDANSETRON 4 MG: 2 INJECTION INTRAMUSCULAR; INTRAVENOUS at 06:58

## 2024-07-11 RX ADMIN — HYDROMORPHONE HYDROCHLORIDE 2 MG: 2 TABLET ORAL at 08:58

## 2024-07-11 RX ADMIN — KETOROLAC TROMETHAMINE 15 MG: 15 INJECTION, SOLUTION INTRAMUSCULAR; INTRAVENOUS at 08:10

## 2024-07-11 ASSESSMENT — ENCOUNTER SYMPTOMS
WHEEZING: 0
BLOOD IN STOOL: 0
SORE THROAT: 0
FEVER: 0
CHILLS: 0
DYSURIA: 0
FREQUENCY: 0
COUGH: 0
PALPITATIONS: 0
ABDOMINAL PAIN: 1
VOMITING: 0
DIAPHORESIS: 0
DIARRHEA: 0
SHORTNESS OF BREATH: 0
NAUSEA: 1
HEADACHES: 0
CONSTIPATION: 0
SINUS PRESSURE: 0

## 2024-07-11 ASSESSMENT — COLUMBIA-SUICIDE SEVERITY RATING SCALE - C-SSRS
2. HAVE YOU ACTUALLY HAD ANY THOUGHTS OF KILLING YOURSELF IN THE PAST MONTH?: NO
1. IN THE PAST MONTH, HAVE YOU WISHED YOU WERE DEAD OR WISHED YOU COULD GO TO SLEEP AND NOT WAKE UP?: NO
6. HAVE YOU EVER DONE ANYTHING, STARTED TO DO ANYTHING, OR PREPARED TO DO ANYTHING TO END YOUR LIFE?: NO

## 2024-07-11 ASSESSMENT — ACTIVITIES OF DAILY LIVING (ADL)
ADLS_ACUITY_SCORE: 35

## 2024-07-11 ASSESSMENT — LIFESTYLE VARIABLES: TOBACCO_USE: 1

## 2024-07-11 NOTE — Clinical Note
Liat Gold was seen and treated in our emergency department on 7/11/2024.  She may return to work on 07/16/2024.       If you have any questions or concerns, please don't hesitate to call.      Iam Mills MD

## 2024-07-11 NOTE — ED TRIAGE NOTES
Pt was seen in [place] last night and was told she needs her gallbladder out. Pt here with increasing pain

## 2024-07-11 NOTE — ANESTHESIA PREPROCEDURE EVALUATION
"Anesthesia Pre-Procedure Evaluation    Patient: Liat Gold   MRN: [id number] : 1985        Procedure : Procedure(s):  CHOLECYSTECTOMY, LAPAROSCOPIC          Past Medical History:   Diagnosis Date    ALISSA 3     LEEP    Irritable bowel syndrome       Past Surgical History:   Procedure Laterality Date    APPENDECTOMY  2007    APPENDECTOMY      BIOPSY CERVICAL, LOCAL EXCISION, SINGLE/MULTIPLE  2007    BIOPSY CERVICAL, LOCAL EXCISION, SINGLE/MULTIPLE       SECTION N/A 2017    Procedure:  SECTION;  Surgeon: Lulú Lee MD;  Location: [facility] L[facility] OR;  Service:     D & C  2009    LAPAROSCOPIC SALPINGECTOMY Bilateral 2022    Procedure: Laparoscopic bilateral tubal sterilization by salpingectomy,;  Surgeon: Sandra Tompkins MD;  Location: [facility] OR    LEEP TX, CERVICAL  2009    ALISSA 3    MARSUPIALIZATION BARTHOLIN CYST N/A 2022    Procedure: removal of right vulvar lesion;  Surgeon: Sandra Tompkins MD;  Location: [facility] OR    PELVIS LAPAROSCOPY,DX  x 2    abdominal pain    WISDOM TOOTH EXTRACTION        Allergies   Allergen Reactions    Codeine     Pcn [Penicillins]      \"doesn't work\"      Social History     Tobacco Use    Smoking status: Every Day     Current packs/day: 0.50     Types: Cigarettes    Smokeless tobacco: Never   Substance Use Topics    Alcohol use: No      Wt Readings from Last 1 Encounters:   22 72.8 kg (160 lb 9.6 oz)        Anesthesia Evaluation   Pt has had prior anesthetic. Type: Regional, MAC and General.        ROS/MED HX  ENT/Pulmonary:     (+)                tobacco use, Current use,                       Neurologic:       Cardiovascular:       METS/Exercise Tolerance:     Hematologic:       Musculoskeletal: Comment: Restless legs  (+)  arthritis,             GI/Hepatic: Comment: Irritable bowel syndrome    (+)       Inflammatory bowel disease,   cholecystitis/cholelithiasis,          Renal/Genitourinary:       Endo: " "      Psychiatric/Substance Use:     (+) psychiatric history anxiety  H/O chronic opiod use .     Infectious Disease:       Malignancy:       Other:            Physical Exam    Airway  airway exam normal      Mallampati: II   TM distance: > 3 FB   Neck ROM: full   Mouth opening: > 3 cm    Respiratory Devices and Support         Dental  no notable dental history     (+) Edentulous      Cardiovascular   cardiovascular exam normal          Pulmonary   pulmonary exam normal                OUTSIDE LABS:  CBC:   Lab Results   Component Value Date    WBC 10.3 07/11/2024    WBC 9.6 02/28/2012    HGB 13.0 07/11/2024    HGB 12.3 02/28/2012    HCT 38.3 07/11/2024    HCT 37.3 02/28/2012     07/11/2024     02/28/2012     BMP:   Lab Results   Component Value Date     07/11/2024     12/26/2011    POTASSIUM 3.6 07/11/2024    POTASSIUM 3.9 12/26/2011    CHLORIDE 104 07/11/2024    CHLORIDE 111 12/26/2011    CO2 24 07/11/2024    CO2 23 10/28/2009    BUN 7.5 07/11/2024    BUN 9 12/26/2011    CR 0.80 07/11/2024    CR 0.7 12/26/2011    GLC 95 07/11/2024    GLC 96 12/26/2011     COAGS: No results found for: \"PTT\", \"INR\", \"FIBR\"  POC:   Lab Results   Component Value Date    HCG Negative 05/04/2022    HCGS Negative 07/11/2024     HEPATIC:   Lab Results   Component Value Date    ALBUMIN 3.8 07/11/2024    PROTTOTAL 7.2 07/11/2024    ALT 10 07/11/2024    AST 21 07/11/2024    ALKPHOS 81 07/11/2024    BILITOTAL 0.4 07/11/2024     OTHER:   Lab Results   Component Value Date    AMAURY 8.7 07/11/2024    LIPASE 15 07/11/2024    AMYLASE 92 01/28/2009    TSH 1.83 06/26/2014    SED 5 07/21/2011       Anesthesia Plan    ASA Status:  2    NPO Status:  NPO Appropriate    Anesthesia Type: General.   Induction: Propofol, Intravenous.   Maintenance: TIVA.        Consents    Anesthesia Plan(s) and associated risks, benefits, and realistic alternatives discussed. Questions answered and patient/representative(s) expressed understanding.   "   - Discussed: Risks, Benefits and Alternatives for BOTH SEDATION and the PROCEDURE were discussed     - Discussed with:  Patient            Postoperative Care    Pain management: Multi-modal analgesia, IV analgesics, Oral pain medications.   PONV prophylaxis: Ondansetron (or other 5HT-3), Dexamethasone or Solumedrol, Background Propofol Infusion     Comments:               PRESTON Vazquez CRNA    I have reviewed the pertinent notes and labs in the chart from the past 30 days and (re)examined the patient.  Any updates or changes from those notes are reflected in this note.

## 2024-07-11 NOTE — CONSULTS
General Surgery Consultation    Liat Gold MRN# [id number]   Age: 39 year old YOB: 1985     Date of Admission:  7/11/2024    Reason for consult:            Abdominal pain, right upper quadrant       Requesting physician:            Dr. Mills                Assessment and Plan:   Assessment:   Liat Gold is a 39 year old female with symptomatic gallstones.     Comorbidities:   has a past medical history of ALISSA 3 (4/09) and Irritable bowel syndrome.      Plan:   I have offered the patient a laparoscopic cholecystectomy.     Labs reviewed, no change from overnight.  Patient is comfortable between exams, freely mobile on cart.  Advised to avoid fatty foods today.    We have discussed the indication, alternatives, risks and expected recovery.  Specifically we have discussed incisions, scarring, postoperative infections, anesthesia, bleeding, blood transfusion, open conversion, common bile duct injury, injury to intra-abdominal organs, adhesions leading to bowel obstruction, retained common bile duct stone, bile leak, DVT, PE, hernia, post cholecystectomy diarrhea, recovery, postoperative dietary restrictions and physical limitations.  We have discussed the recommended interventions and treatments for these complications.  All questions have been answered to the best of my ability.    She elects to proceed with surgery, planned for tomorrow.                 Chief Complaint:   Abdominal pain, right upper quadrant     History is obtained from the patient.         History of Present Illness:   Liat Gold is a 39 year old  female who presents with right upper quadrant abdominal pain for the past 1 days.  The pain is intermittent.  She has had similar pain in the past.  There is an association with eating fatty foods. Ate pizza last night.  Was seen at [place] ED, sent home.   Positive for associated symptoms of anorexia and nausea.  She  does not have a history of jaundice or dark  urine.  She  has not had pancreatitis in the past.              Past Medical History:    has a past medical history of ALISSA 3 () and Irritable bowel syndrome.          Past Surgical History:     Past Surgical History:   Procedure Laterality Date    APPENDECTOMY  2007    APPENDECTOMY      BIOPSY CERVICAL, LOCAL EXCISION, SINGLE/MULTIPLE  2007    BIOPSY CERVICAL, LOCAL EXCISION, SINGLE/MULTIPLE       SECTION N/A 2017    Procedure:  SECTION;  Surgeon: Lulú Lee MD;  Location: [facility] OR;  Service:     D & C  2009    LAPAROSCOPIC SALPINGECTOMY Bilateral 2022    Procedure: Laparoscopic bilateral tubal sterilization by salpingectomy,;  Surgeon: Sandra Tompkins MD;  Location: [facility] OR    LEEP TX, CERVICAL  2009    ALISSA 3    MARSUPIALIZATION BARTHOLIN CYST N/A 2022    Procedure: removal of right vulvar lesion;  Surgeon: Sandra Tompkins MD;  Location: [facility] OR    PELVIS LAPAROSCOPY,DX  x 2    abdominal pain    WISDOM TOOTH EXTRACTION               Social History:     Social History     Tobacco Use    Smoking status: Every Day     Current packs/day: 0.50     Types: Cigarettes    Smokeless tobacco: Never   Substance Use Topics    Alcohol use: No             Family History:   Family history reviewed and is not pertinent.         Allergies:   All allergies reviewed and addressed          Medications:     Current Facility-Administered Medications   Medication Dose Route Frequency Provider Last Rate Last Admin    HYDROmorphone (DILAUDID) tablet 2 mg  2 mg Oral Q4H PRN Iam Mills MD   2 mg at 24 0858     Current Outpatient Medications   Medication Sig Dispense Refill    ** PATIENT ALERT ** DO NOT EXCEED 4,000 MG OF ACETAMINOPHEN FROM ALL SOURCES IN 24 HOURS (Patient not taking: Reported on 2023)      acetaminophen (TYLENOL) 325 MG tablet Take 1-2 tablets (325-650 mg) by mouth every 6 hours as needed for mild pain (Patient not taking: Reported  on 12/5/2023) 50 tablet 0    ibuprofen (ADVIL/MOTRIN) 800 MG tablet Take 1 tablet (800 mg) by mouth every 6 hours as needed for other (mild and/or inflammatory pain) (Patient not taking: Reported on 12/5/2023) 30 tablet 0    ondansetron (ZOFRAN ODT) 4 MG ODT tab DISSOLVE 1 TABLET IN MOUTH TWICE DAILY AS NEEDED 30 tablet 1    senna-docusate (SENOKOT-S/PERICOLACE) 8.6-50 MG tablet 2 tabs bid 120 tablet 0     Current Facility-Administered Medications   Medication Dose Route Frequency Provider Last Rate Last Admin            Review of Systems:   The 10 point review of systems is negative other than noted in the HPI.          Physical Exam:   BP 98/89   Pulse 50   Temp 98  F (36.7  C) (Oral)   Resp 20   SpO2 98%   General - Well developed, well nourished female in no apparent distress  HEENT:  Head normocephalic and atraumatic, pupils equal and round, conjunctivae clear, no scleral icterus, mucous membranes moist, external ears and nose normal  Neck: Supple without thyromegaly or masses  Lymphatic: No cervical, or supraclavicular lymphadenopathy  Lungs: Clear to auscultation bilaterally  Heart: regular rate and rhythm, no murmurs  Abdomen:   soft, flat, non-distended with mild tenderness noted in the right upper quadrant  and Mendieta's sign is absent. no masses palpated  Extremities: Warm without edema  Neurologic: nonfocal  Psychiatric: Mood and affect appropriate  Skin: Without lesions, rashes, or juandice         Data:     WBC -   Lab Results   Component Value Date    WBC 10.3 07/11/2024       HgB -   Lab Results   Component Value Date    HGB 13.0 07/11/2024       Plt-   Lab Results   Component Value Date     07/11/2024       Liver Function Studies -   Recent Labs   Lab Test 07/11/24  0655   PROTTOTAL 7.2   ALBUMIN 3.8   BILITOTAL 0.4   ALKPHOS 81   AST 21   ALT 10       Lipase-   Lab Results   Component Value Date    LIPASE 15 07/11/2024         Imaging:  All imaging studies reviewed by me.    Results for  orders placed or performed in visit on 07/28/22   XR Foot Left G/E 3 Views    Narrative    LEFT FOOT THREE OR MORE VIEWS  7/28/2022 8:27 AM     INDICATION: Left foot pain.    COMPARISON: None.      Impression    IMPRESSION: Normal joint spacing and alignment.  No fracture.    CHEL GALICIA MD         SYSTEM ID:  [id number]       This note was created using voice recognition software. Undetected word substitutions or other errors may have occurred.     Time spent with the patient, reviewing the EMR, reviewing laboratory and imaging studies, more than 50% of which was counseling and coordinating care:  45 minutes.     Zay Avila, DO

## 2024-07-11 NOTE — DISCHARGE INSTRUCTIONS
ICD-10-CM    1. Biliary colic  K80.50     take ibuprofen 600 mg every 6 hours with food or milk.  tylenol 1000 mg every 6 hours. after midniught no ibuprofen when you are without eating.  hydrate well before this.  talrika dilaudid for breathough pain.  surgery tomorrow with Dr. FUNES

## 2024-07-11 NOTE — ED PROVIDER NOTES
"  History     Chief Complaint   Patient presents with    Abdominal Pain     HPI  Liat Gold is a 39 year old female who presents with a history of opioid dependence in remission.  She was seen at [facility] for right upper quadrant abdominal pain presumed to have biliary colic after 12 hours of pain and had no signs of an acute cholecystitis at that time.  She was referred to see general surgery.  Most of her care is done at this facility and she arrives here this morning with persistent pain.  This apparently been in the right upper quadrant no blood in the stool or black tarry stools no dysuria urgency frequency materia.  No associated fever.  Nausea.  No current vomiting.  She notes that precipitating factors included pizza before this episode.  She denies current pregnancy has had prior tubal ligation.  The right upper quadrant pain has been radiating to the same region of the back as well as to the top of the right shoulder.  Associated with any cardiopulmonary symptoms.  Came on after eating pizza.  Has persisted through the night.  The right upper quadrant ultrasound also showed no signs of a common bile duct stone and the LFTs were normal  Allergies:  Allergies   Allergen Reactions    Codeine     Pcn [Penicillins]      \"doesn't work\"       Problem List:    Patient Active Problem List    Diagnosis Date Noted    Severe dysplasia of cervix (ALISSA III) 12/29/2006     Priority: High     Class: Acute     12/14/06 HSIL  12/29/06 Colpo ALISSA 3. Recolpo 3 months (pregnant).  2/3/09 ASCUS, + HPV 16,18 (neg other). Recommend re-colpo.  3/4/09 Colpo ALISSA 3. Recommend LEEP  4/8/09 LEEP ALISSA 3 with + margins. Repeat pap 6 months.  2/23/10 NIL pap. Repeat pap 6 months.  4/25/11 Lost to follow-up for pap tracking   2/28/12 NIL pap. Repeat pap 1 year.   Gap in care  2/10/21 NIL Pap, Neg HPV. Plan cotest in 1 year.   3/25/22 NIL pap, neg HPV. Plan: cotest in 1 year  5/4/23 Lost to follow-up for pap tracking    "    Opioid dependence in remission (H) 2015     Priority: Medium     Hx of addiction/dependency.  Off methadone and any opioids around .  Has been sober since.        Arthritis 2011     Priority: Medium     Lots of aches and pains.  Sed rate, lyme, hep C normal       CARDIOVASCULAR SCREENING; LDL GOAL LESS THAN 160 2011     Priority: Medium    Anxiety 2011     Priority: Medium    Smoker 2009     Priority: Medium     Encourage cessation      Restless legs 2009     Priority: Medium     Sinemet helps.        Female pelvic pain 2009     Priority: Medium        Past Medical History:    Past Medical History:   Diagnosis Date    ALISSA 3     Irritable bowel syndrome        Past Surgical History:    Past Surgical History:   Procedure Laterality Date    APPENDECTOMY  2007    APPENDECTOMY      BIOPSY CERVICAL, LOCAL EXCISION, SINGLE/MULTIPLE  2007    BIOPSY CERVICAL, LOCAL EXCISION, SINGLE/MULTIPLE       SECTION N/A 2017    Procedure:  SECTION;  Surgeon: Lulú Lee MD;  Location: [facility] L[facility]D OR;  Service:     D & C  2009    LAPAROSCOPIC SALPINGECTOMY Bilateral 2022    Procedure: Laparoscopic bilateral tubal sterilization by salpingectomy,;  Surgeon: Sandra Tompkins MD;  Location: [facility] OR    LEEP TX, CERVICAL  2009    ALISSA 3    MARSUPIALIZATION BARTHOLIN CYST N/A 2022    Procedure: removal of right vulvar lesion;  Surgeon: Sandra Tompkins MD;  Location: [facility] OR    PELVIS LAPAROSCOPY,DX  x 2    abdominal pain    WISDOM TOOTH EXTRACTION         Family History:    Family History   Problem Relation Age of Onset    Gastrointestinal Disease Mother         IBS    Cancer Maternal Grandfather         lung    Gastrointestinal Disease Maternal Grandmother         IBS    Cancer - colorectal Paternal Grandmother     Asthma No family hx of     C.A.D. No family hx of     Diabetes No family hx of     Hypertension No family hx of      Cerebrovascular Disease No family hx of     Breast Cancer No family hx of     Prostate Cancer No family hx of        Social History:  Marital Status:  Single [1]  Social History     Tobacco Use    Smoking status: Every Day     Current packs/day: 0.50     Types: Cigarettes    Smokeless tobacco: Never   Vaping Use    Vaping status: Never Used   Substance Use Topics    Alcohol use: No    Drug use: No     Comment: marijuana last use 11/2/06.  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        Medications:    ** PATIENT ALERT **  acetaminophen (TYLENOL) 325 MG tablet  ibuprofen (ADVIL/MOTRIN) 800 MG tablet  ondansetron (ZOFRAN ODT) 4 MG ODT tab  senna-docusate (SENOKOT-S/PERICOLACE) 8.6-50 MG tablet          Review of Systems   Constitutional:  Negative for chills, diaphoresis and fever.   HENT:  Negative for ear pain, sinus pressure and sore throat.    Eyes:  Negative for visual disturbance.   Respiratory:  Negative for cough, shortness of breath and wheezing.    Cardiovascular:  Negative for chest pain and palpitations.   Gastrointestinal:  Positive for abdominal pain and nausea. Negative for blood in stool, constipation, diarrhea and vomiting.   Genitourinary:  Negative for dysuria, frequency and urgency.   Skin:  Negative for rash.   Neurological:  Negative for headaches.   All other systems reviewed and are negative.        Physical Exam   BP: 124/78  Pulse: 60  Temp: 98  F (36.7  C)  Resp: 18  SpO2: 99 %      Physical Exam  Constitutional:       General: She is in acute distress.      Appearance: She is not diaphoretic.   Eyes:      Conjunctiva/sclera: Conjunctivae normal.   Cardiovascular:      Rate and Rhythm: Normal rate and regular rhythm.   Pulmonary:      Effort: Pulmonary effort is normal. No respiratory distress.      Breath sounds: No stridor. No wheezing or rhonchi.   Abdominal:      General: Abdomen is flat. There is no distension.      Palpations: Abdomen is soft. There is no mass.       Tenderness: There is abdominal tenderness in the right upper quadrant. There is no guarding.   Musculoskeletal:      Cervical back: Neck supple.      Right lower leg: No edema.      Left lower leg: No edema.   Skin:     Coloration: Skin is not pale.      Findings: No rash.   Neurological:      Mental Status: She is alert.      Motor: No weakness.         ED Course        Procedures             Critical Care time:  none               Results for orders placed or performed during the hospital encounter of 07/11/24 (from the past 24 hour(s))   [place] Draw    Narrative    The following orders were created for panel order [place] Draw.  Procedure                               Abnormality         Status                     ---------                               -----------         ------                     Extra Blue Top Tube[[id number]]                              Final result               Extra Red Top Tube[[id number]]                               Final result               Extra Green Top (Lithium...[[id number]]                      Final result               Extra Purple Top Tube[[id number]]                            Final result                 Please view results for these tests on the individual orders.   Extra Blue Top Tube   Result Value Ref Range    Hold Specimen JIC    Extra Red Top Tube   Result Value Ref Range    Hold Specimen JIC    Extra Green Top (Lithium Heparin) Tube   Result Value Ref Range    Hold Specimen JIC    Extra Purple Top Tube   Result Value Ref Range    Hold Specimen JIC    CBC with platelets differential    Narrative    The following orders were created for panel order CBC with platelets differential.  Procedure                               Abnormality         Status                     ---------                               -----------         ------                     CBC with platelets and d...[[id number]]                      Final result                 Please view results for  these tests on the individual orders.   Comprehensive metabolic panel   Result Value Ref Range    Sodium 137 135 - 145 mmol/L    Potassium 3.6 3.4 - 5.3 mmol/L    Carbon Dioxide (CO2) 24 22 - 29 mmol/L    Anion Gap 9 7 - 15 mmol/L    Urea Nitrogen 7.5 6.0 - 20.0 mg/dL    Creatinine 0.80 0.51 - 0.95 mg/dL    GFR Estimate >90 >60 mL/min/1.73m2    Calcium 8.7 8.6 - 10.0 mg/dL    Chloride 104 98 - 107 mmol/L    Glucose 95 70 - 99 mg/dL    Alkaline Phosphatase 81 40 - 150 U/L    AST 21 0 - 45 U/L    ALT 10 0 - 50 U/L    Protein Total 7.2 6.4 - 8.3 g/dL    Albumin 3.8 3.5 - 5.2 g/dL    Bilirubin Total 0.4 <=1.2 mg/dL   Lipase   Result Value Ref Range    Lipase 15 13 - 60 U/L   HCG qualitative pregnancy (blood)   Result Value Ref Range    hCG Serum Qualitative Negative Negative   CBC with platelets and differential   Result Value Ref Range    WBC Count 10.3 4.0 - 11.0 10e3/uL    RBC Count 4.23 3.80 - 5.20 10e6/uL    Hemoglobin 13.0 11.7 - 15.7 g/dL    Hematocrit 38.3 35.0 - 47.0 %    MCV 91 78 - 100 fL    MCH 30.7 26.5 - 33.0 pg    MCHC 33.9 31.5 - 36.5 g/dL    RDW 12.6 10.0 - 15.0 %    Platelet Count 312 150 - 450 10e3/uL    % Neutrophils 61 %    % Lymphocytes 29 %    % Monocytes 6 %    % Eosinophils 2 %    % Basophils 1 %    % Immature Granulocytes 1 %    NRBCs per 100 WBC 0 <1 /100    Absolute Neutrophils 6.2 1.6 - 8.3 10e3/uL    Absolute Lymphocytes 3.0 0.8 - 5.3 10e3/uL    Absolute Monocytes 0.7 0.0 - 1.3 10e3/uL    Absolute Eosinophils 0.2 0.0 - 0.7 10e3/uL    Absolute Basophils 0.1 0.0 - 0.2 10e3/uL    Absolute Immature Granulocytes 0.1 <=0.4 10e3/uL    Absolute NRBCs 0.0 10e3/uL       Medications   ondansetron (ZOFRAN) injection 4 mg (4 mg Intravenous $Given 7/11/24 0658)   dextrose 5% in lactated ringers infusion (1,000 mLs Intravenous $New Bag 7/11/24 0810)   ketorolac (TORADOL) injection 15 mg (15 mg Intravenous $Given 7/11/24 0810)       Assessments & Plan (with Medical Decision Making)     MDM: Liat MADRIGAL  Asif is a 39 year old female presenting with an episode of persistent right upper quadrant abdominal pain with multiple gallstones seen on ultrasound and with pain radiating classically to the shoulder and to the back.  Reassuring laboratory test afebrile here.  History of opioid dependence in remission.     Patient had pain throughout her evaluation although was controlled on both Toradol with oral Dilaudid 0.2 mg.  Given the persistent pain with biliary colic and reassuring laboratory testing, I spoke with Dr. Avila and will plan the patient for cholecystectomy outpatient tomorrow and I have arranged for pain management for the patient overnight.  We did discuss the risk of relapse and she we will be careful with the meds.  She is been 13 years sober and has been proud of this.  Management as below.  Precautions given for return.    I have reviewed the nursing notes.    I have reviewed the findings, diagnosis, plan and need for follow up with the patient.           Medical Decision Making  The patient's presentation was of moderate complexity (an acute illness with systemic symptoms).    The patient's evaluation involved:  ordering and/or review of 3+ test(s) in this encounter (see separate area of note for details)    The patient's management necessitated moderate risk (prescription drug management including medications given in the ED).        New Prescriptions    No medications on file       Final diagnoses:   Biliary colic - take ibuprofen 600 mg every 6 hours with food or milk.  tylenol 1000 mg every 6 hours. after midniught no ibuprofen when you are without eating.  hydrate well before this.  tale dilaudid for breathough pain.  surgery tomorrow with Dr. FUNES        7/11/2024   [facility] EMERGENCY DEPT       Iam Mills MD  07/11/24 8017

## 2024-07-12 ENCOUNTER — HOSPITAL ENCOUNTER (OUTPATIENT)
Facility: CLINIC | Age: 39
Discharge: HOME | End: 2024-07-12
Attending: SURGERY | Admitting: SURGERY
Payer: COMMERCIAL

## 2024-07-12 ENCOUNTER — ANESTHESIA (OUTPATIENT)
Dept: SURGERY | Facility: CLINIC | Age: 39
End: 2024-07-12
Payer: COMMERCIAL

## 2024-07-12 VITALS
TEMPERATURE: 98 F | BODY MASS INDEX: 27.44 KG/M2 | OXYGEN SATURATION: 94 % | SYSTOLIC BLOOD PRESSURE: 112 MMHG | RESPIRATION RATE: 14 BRPM | WEIGHT: 150 LBS | DIASTOLIC BLOOD PRESSURE: 79 MMHG | HEART RATE: 64 BPM

## 2024-07-12 DIAGNOSIS — K81.0 ACUTE CHOLECYSTITIS: Primary | ICD-10-CM

## 2024-07-12 PROCEDURE — 250N000025 HC SEVOFLURANE, PER MIN: Performed by: SURGERY

## 2024-07-12 PROCEDURE — 710N000012 HC RECOVERY PHASE 2, PER MINUTE: Performed by: SURGERY

## 2024-07-12 PROCEDURE — 370N000017 HC ANESTHESIA TECHNICAL FEE, PER MIN: Performed by: SURGERY

## 2024-07-12 PROCEDURE — 88304 TISSUE EXAM BY PATHOLOGIST: CPT | Mod: TC | Performed by: SURGERY

## 2024-07-12 PROCEDURE — 250N000011 HC RX IP 250 OP 636: Performed by: NURSE ANESTHETIST, CERTIFIED REGISTERED

## 2024-07-12 PROCEDURE — 258N000003 HC RX IP 258 OP 636: Performed by: SURGERY

## 2024-07-12 PROCEDURE — 250N000009 HC RX 250: Performed by: SURGERY

## 2024-07-12 PROCEDURE — 360N000076 HC SURGERY LEVEL 3, PER MIN: Performed by: SURGERY

## 2024-07-12 PROCEDURE — 250N000009 HC RX 250: Performed by: NURSE ANESTHETIST, CERTIFIED REGISTERED

## 2024-07-12 PROCEDURE — 250N000011 HC RX IP 250 OP 636: Performed by: SURGERY

## 2024-07-12 PROCEDURE — 47562 LAPAROSCOPIC CHOLECYSTECTOMY: CPT | Mod: 22 | Performed by: SURGERY

## 2024-07-12 PROCEDURE — 710N000009 HC RECOVERY PHASE 1, LEVEL 1, PER MIN: Performed by: SURGERY

## 2024-07-12 PROCEDURE — 272N000001 HC OR GENERAL SUPPLY STERILE: Performed by: SURGERY

## 2024-07-12 PROCEDURE — 250N000009 HC RX 250

## 2024-07-12 PROCEDURE — 271N000001 HC OR GENERAL SUPPLY NON-STERILE: Performed by: SURGERY

## 2024-07-12 PROCEDURE — 999N000141 HC STATISTIC PRE-PROCEDURE NURSING ASSESSMENT: Performed by: SURGERY

## 2024-07-12 PROCEDURE — 250N000013 HC RX MED GY IP 250 OP 250 PS 637: Performed by: SURGERY

## 2024-07-12 PROCEDURE — 258N000001 HC RX 258: Performed by: SURGERY

## 2024-07-12 PROCEDURE — 88304 TISSUE EXAM BY PATHOLOGIST: CPT | Mod: 26 | Performed by: PATHOLOGY

## 2024-07-12 PROCEDURE — 47562 LAPAROSCOPIC CHOLECYSTECTOMY: CPT | Mod: 22 | Performed by: PHYSICIAN ASSISTANT

## 2024-07-12 RX ORDER — BUPIVACAINE HYDROCHLORIDE 5 MG/ML
INJECTION, SOLUTION PERINEURAL PRN
Status: DISCONTINUED | OUTPATIENT
Start: 2024-07-12 | End: 2024-07-12 | Stop reason: HOSPADM

## 2024-07-12 RX ORDER — CEFAZOLIN SODIUM/WATER 2 G/20 ML
2 SYRINGE (ML) INTRAVENOUS SEE ADMIN INSTRUCTIONS
Status: DISCONTINUED | OUTPATIENT
Start: 2024-07-12 | End: 2024-07-12 | Stop reason: HOSPADM

## 2024-07-12 RX ORDER — ONDANSETRON 2 MG/ML
4 INJECTION INTRAMUSCULAR; INTRAVENOUS EVERY 30 MIN PRN
Status: DISCONTINUED | OUTPATIENT
Start: 2024-07-12 | End: 2024-07-12 | Stop reason: HOSPADM

## 2024-07-12 RX ORDER — MAGNESIUM SULFATE HEPTAHYDRATE 40 MG/ML
4 INJECTION, SOLUTION INTRAVENOUS ONCE
Status: COMPLETED | OUTPATIENT
Start: 2024-07-12 | End: 2024-07-12

## 2024-07-12 RX ORDER — HYDROMORPHONE HCL IN WATER/PF 6 MG/30 ML
0.4 PATIENT CONTROLLED ANALGESIA SYRINGE INTRAVENOUS EVERY 5 MIN PRN
Status: DISCONTINUED | OUTPATIENT
Start: 2024-07-12 | End: 2024-07-12 | Stop reason: HOSPADM

## 2024-07-12 RX ORDER — SODIUM CHLORIDE, SODIUM LACTATE, POTASSIUM CHLORIDE, CALCIUM CHLORIDE 600; 310; 30; 20 MG/100ML; MG/100ML; MG/100ML; MG/100ML
INJECTION, SOLUTION INTRAVENOUS CONTINUOUS
Status: DISCONTINUED | OUTPATIENT
Start: 2024-07-12 | End: 2024-07-12 | Stop reason: HOSPADM

## 2024-07-12 RX ORDER — ACETAMINOPHEN 325 MG/1
975 TABLET ORAL ONCE
Status: COMPLETED | OUTPATIENT
Start: 2024-07-12 | End: 2024-07-12

## 2024-07-12 RX ORDER — HYDROMORPHONE HCL IN WATER/PF 6 MG/30 ML
0.2 PATIENT CONTROLLED ANALGESIA SYRINGE INTRAVENOUS EVERY 5 MIN PRN
Status: DISCONTINUED | OUTPATIENT
Start: 2024-07-12 | End: 2024-07-12 | Stop reason: HOSPADM

## 2024-07-12 RX ORDER — NALOXONE HYDROCHLORIDE 0.4 MG/ML
0.1 INJECTION, SOLUTION INTRAMUSCULAR; INTRAVENOUS; SUBCUTANEOUS
Status: DISCONTINUED | OUTPATIENT
Start: 2024-07-12 | End: 2024-07-12 | Stop reason: HOSPADM

## 2024-07-12 RX ORDER — INDOCYANINE GREEN AND WATER 25 MG
2.5 KIT INJECTION ONCE
Status: DISCONTINUED | OUTPATIENT
Start: 2024-07-12 | End: 2024-07-12 | Stop reason: HOSPADM

## 2024-07-12 RX ORDER — GABAPENTIN 300 MG/1
300 CAPSULE ORAL
Status: COMPLETED | OUTPATIENT
Start: 2024-07-12 | End: 2024-07-12

## 2024-07-12 RX ORDER — CEFAZOLIN SODIUM/WATER 2 G/20 ML
2 SYRINGE (ML) INTRAVENOUS
Status: COMPLETED | OUTPATIENT
Start: 2024-07-12 | End: 2024-07-12

## 2024-07-12 RX ORDER — OXYCODONE HYDROCHLORIDE 5 MG/1
5 TABLET ORAL
Status: COMPLETED | OUTPATIENT
Start: 2024-07-12 | End: 2024-07-12

## 2024-07-12 RX ORDER — ONDANSETRON 4 MG/1
4 TABLET, ORALLY DISINTEGRATING ORAL EVERY 30 MIN PRN
Status: DISCONTINUED | OUTPATIENT
Start: 2024-07-12 | End: 2024-07-12 | Stop reason: HOSPADM

## 2024-07-12 RX ORDER — LIDOCAINE HYDROCHLORIDE 20 MG/ML
INJECTION, SOLUTION INFILTRATION; PERINEURAL PRN
Status: DISCONTINUED | OUTPATIENT
Start: 2024-07-12 | End: 2024-07-12

## 2024-07-12 RX ORDER — FENTANYL CITRATE 50 UG/ML
INJECTION, SOLUTION INTRAMUSCULAR; INTRAVENOUS PRN
Status: DISCONTINUED | OUTPATIENT
Start: 2024-07-12 | End: 2024-07-12

## 2024-07-12 RX ORDER — OXYCODONE HYDROCHLORIDE 5 MG/1
5 TABLET ORAL EVERY 6 HOURS PRN
Qty: 12 TABLET | Refills: 0 | Status: SHIPPED | OUTPATIENT
Start: 2024-07-12 | End: 2024-07-15

## 2024-07-12 RX ORDER — ONDANSETRON 2 MG/ML
INJECTION INTRAMUSCULAR; INTRAVENOUS PRN
Status: DISCONTINUED | OUTPATIENT
Start: 2024-07-12 | End: 2024-07-12

## 2024-07-12 RX ORDER — PROPOFOL 10 MG/ML
INJECTION, EMULSION INTRAVENOUS PRN
Status: DISCONTINUED | OUTPATIENT
Start: 2024-07-12 | End: 2024-07-12

## 2024-07-12 RX ORDER — IPRATROPIUM BROMIDE AND ALBUTEROL SULFATE 2.5; .5 MG/3ML; MG/3ML
3 SOLUTION RESPIRATORY (INHALATION) ONCE
Status: COMPLETED | OUTPATIENT
Start: 2024-07-12 | End: 2024-07-12

## 2024-07-12 RX ORDER — LIDOCAINE 40 MG/G
CREAM TOPICAL
Status: DISCONTINUED | OUTPATIENT
Start: 2024-07-12 | End: 2024-07-12 | Stop reason: HOSPADM

## 2024-07-12 RX ORDER — DEXAMETHASONE SODIUM PHOSPHATE 4 MG/ML
INJECTION, SOLUTION INTRA-ARTICULAR; INTRALESIONAL; INTRAMUSCULAR; INTRAVENOUS; SOFT TISSUE PRN
Status: DISCONTINUED | OUTPATIENT
Start: 2024-07-12 | End: 2024-07-12

## 2024-07-12 RX ORDER — FENTANYL CITRATE 50 UG/ML
50 INJECTION, SOLUTION INTRAMUSCULAR; INTRAVENOUS EVERY 5 MIN PRN
Status: DISCONTINUED | OUTPATIENT
Start: 2024-07-12 | End: 2024-07-12 | Stop reason: HOSPADM

## 2024-07-12 RX ORDER — KETOROLAC TROMETHAMINE 30 MG/ML
INJECTION, SOLUTION INTRAMUSCULAR; INTRAVENOUS PRN
Status: DISCONTINUED | OUTPATIENT
Start: 2024-07-12 | End: 2024-07-12

## 2024-07-12 RX ORDER — DEXAMETHASONE SODIUM PHOSPHATE 4 MG/ML
4 INJECTION, SOLUTION INTRA-ARTICULAR; INTRALESIONAL; INTRAMUSCULAR; INTRAVENOUS; SOFT TISSUE
Status: DISCONTINUED | OUTPATIENT
Start: 2024-07-12 | End: 2024-07-12 | Stop reason: HOSPADM

## 2024-07-12 RX ORDER — LIDOCAINE HYDROCHLORIDE AND EPINEPHRINE 10; 10 MG/ML; UG/ML
INJECTION, SOLUTION INFILTRATION; PERINEURAL PRN
Status: DISCONTINUED | OUTPATIENT
Start: 2024-07-12 | End: 2024-07-12 | Stop reason: HOSPADM

## 2024-07-12 RX ORDER — FENTANYL CITRATE 50 UG/ML
25 INJECTION, SOLUTION INTRAMUSCULAR; INTRAVENOUS EVERY 5 MIN PRN
Status: DISCONTINUED | OUTPATIENT
Start: 2024-07-12 | End: 2024-07-12 | Stop reason: HOSPADM

## 2024-07-12 RX ADMIN — KETOROLAC TROMETHAMINE 15 MG: 30 INJECTION, SOLUTION INTRAMUSCULAR at 12:43

## 2024-07-12 RX ADMIN — SODIUM CHLORIDE, POTASSIUM CHLORIDE, SODIUM LACTATE AND CALCIUM CHLORIDE: 600; 310; 30; 20 INJECTION, SOLUTION INTRAVENOUS at 10:35

## 2024-07-12 RX ADMIN — GABAPENTIN 300 MG: 300 CAPSULE ORAL at 10:21

## 2024-07-12 RX ADMIN — ROCURONIUM BROMIDE 40 MG: 50 INJECTION, SOLUTION INTRAVENOUS at 11:16

## 2024-07-12 RX ADMIN — ONDANSETRON 4 MG: 2 INJECTION INTRAMUSCULAR; INTRAVENOUS at 12:42

## 2024-07-12 RX ADMIN — PROPOFOL 30 MG: 10 INJECTION, EMULSION INTRAVENOUS at 12:47

## 2024-07-12 RX ADMIN — FENTANYL CITRATE 50 MCG: 50 INJECTION INTRAMUSCULAR; INTRAVENOUS at 13:30

## 2024-07-12 RX ADMIN — IPRATROPIUM BROMIDE AND ALBUTEROL SULFATE 3 ML: .5; 3 SOLUTION RESPIRATORY (INHALATION) at 10:32

## 2024-07-12 RX ADMIN — DEXAMETHASONE SODIUM PHOSPHATE 8 MG: 4 INJECTION, SOLUTION INTRA-ARTICULAR; INTRALESIONAL; INTRAMUSCULAR; INTRAVENOUS; SOFT TISSUE at 11:16

## 2024-07-12 RX ADMIN — Medication 2 G: at 11:05

## 2024-07-12 RX ADMIN — SODIUM CHLORIDE, POTASSIUM CHLORIDE, SODIUM LACTATE AND CALCIUM CHLORIDE: 600; 310; 30; 20 INJECTION, SOLUTION INTRAVENOUS at 12:34

## 2024-07-12 RX ADMIN — PROPOFOL 50 MG: 10 INJECTION, EMULSION INTRAVENOUS at 12:37

## 2024-07-12 RX ADMIN — FENTANYL CITRATE 100 MCG: 50 INJECTION INTRAMUSCULAR; INTRAVENOUS at 11:16

## 2024-07-12 RX ADMIN — ROCURONIUM BROMIDE 10 MG: 50 INJECTION, SOLUTION INTRAVENOUS at 12:25

## 2024-07-12 RX ADMIN — PROPOFOL 200 MG: 10 INJECTION, EMULSION INTRAVENOUS at 11:16

## 2024-07-12 RX ADMIN — MIDAZOLAM 2 MG: 1 INJECTION INTRAMUSCULAR; INTRAVENOUS at 11:09

## 2024-07-12 RX ADMIN — LIDOCAINE HYDROCHLORIDE 100 MG: 20 INJECTION, SOLUTION INFILTRATION; PERINEURAL at 11:16

## 2024-07-12 RX ADMIN — ACETAMINOPHEN 975 MG: 325 TABLET, FILM COATED ORAL at 10:21

## 2024-07-12 RX ADMIN — HYDROMORPHONE HYDROCHLORIDE 1 MG: 1 INJECTION, SOLUTION INTRAMUSCULAR; INTRAVENOUS; SUBCUTANEOUS at 11:37

## 2024-07-12 RX ADMIN — MAGNESIUM SULFATE HEPTAHYDRATE 4 G: 40 INJECTION, SOLUTION INTRAVENOUS at 11:41

## 2024-07-12 RX ADMIN — OXYCODONE HYDROCHLORIDE 5 MG: 5 TABLET ORAL at 14:03

## 2024-07-12 RX ADMIN — SUGAMMADEX 200 MG: 100 INJECTION, SOLUTION INTRAVENOUS at 12:43

## 2024-07-12 ASSESSMENT — ACTIVITIES OF DAILY LIVING (ADL)
ADLS_ACUITY_SCORE: 20

## 2024-07-12 NOTE — PROGRESS NOTES
[facility] DISCHARGE NOTE    Patient discharged to home at 2:16 PM via wheel chair. Accompanied by spouse and staff. Discharge instructions reviewed with patient and spouse, opportunity offered to ask questions. Prescriptions sent to patients preferred pharmacy. All belongings sent with patient.    Sandra Peoples RN

## 2024-07-12 NOTE — OP NOTE
Procedure Date: July 12, 2024    Pre-operative Diagnosis: Symptomatic Cholelithiasis    Post-operative Diagnosis:  Acute calculus cholecystitis    Procedure Performed: Laparoscopic Cholecystectomy (Modifier 22)    Surgeon: Zay Avila DO    Assistants: Manny Rosa PA-C (Needed for retraction, suction, assistance with closure)    Anesthesia Type: General plus local    Findings: Critical view of safety, short cystic duct, significant inflammation    Estimated Blood Loss: 30 mL           Condition: Stable      Indications:   Ms. Liat Gold presents with right upper abdominal pain after meals, and characteristics of cholelithiasis on routine ultrasound. She may benefit from cholecystectomy, and was advised of the indications, alternatives, benefits, and risks (including, but not limited to, bleeding, infection, conversion to open surgery, potential for bile leak or bile duct injury, MI, DVT/PE, or death). She understood the information and consented to the aforementioned operative procedure    Procedure: The patient was brought to the Operating Room and placed on the operating table in a supine position. After induction of general endotracheal anesthesia, the abdomen was prepped and draped in the usual sterile fashion. The abdomen was entered through an infraumbilical incision using an open Deb approach, a 5mm trochar was delivered under direct visualization, and the abdomen was insufflated with CO2 gas to a pressure of 15mmHg. Standard laparoscopic cholecystectomy ports were placed in the epigastrium, right midclavicular line and right anterior axillary line. The gallbladder was identified and appeared tense.  Attempts at grasping were quite difficult due to inflammation.  An aspiration needle was used, however minimal return of thick bile was produce. The fundus retracted superiorly.  Dissection was undertaken with a combination of [place], suction and electrocautery where appropriate.  ICG was used  periodically to identify ductal structures.      Several diminutive vessels were taken with cautery and single clips on the stay side (2 clips).  The inflammation was severe at the infundibulum with an impacted, large stone. The cystic duct was isolated and quite short.   This was carefully dissected to establish the critical view of safety, no formal artery was identified during this dissection, however single structure emanating directly from the gallbladder with clear liver behind was obtained.  The cystic duct was secured with two hemoclips proximal and one hemooclip distal, prior to division. The gallbladder was then dissected from the liver bed using electrocautery, this was difficult due to thick rind and severe inflammation. The gallbladder was retrieved through the epigastric port, which had to be extended due to the size of the gallstones, and the trochar replaced. Once pneumoperitoneum was reestablished, the gallbladder bed was inspected for bleeding and bile leak and no bile leak was found.  Bleeding was controlled with cautery and surgicel.  A single band of omentum from her prior surgery was taken down with feli. The patient was positioned flat, irrigant solution was suctioned free, and pneumoperitoneum was relieved before removing the trocars.     An instrument count, including laparotomy pads, sponges and needles was performed and found to be correct. Fascial closure at the epigastric port site was accomplished with a single 0-Vicryl suture. Subcutaneous closure was accomplished with interrupted 3-0 Vicryl suture. Skin edges were re-approximated with 4-0 Monocryl suture, and the wounds were all cleansed and dried prior to application of sterile glue. The patient tolerated the procedure well, was extubated without incident, and transferred to the PACU in stable condition.    Modifier 22 applies to case, with over 1 hour additional time required due to severe inflammation and scarring.    Zay OLSON  DO [place] on 7/12/2024 at 12:53 PM

## 2024-07-12 NOTE — ANESTHESIA CARE TRANSFER NOTE
Patient: Liat Gold    Procedure: Procedure(s):  CHOLECYSTECTOMY, LAPAROSCOPIC       Diagnosis: Symptomatic cholelithiasis [K80.20]  Diagnosis Additional Information: No value filed.    Anesthesia Type:   General     Note:    Oropharynx: oropharynx clear of all foreign objects and spontaneously breathing  Level of Consciousness: drowsy  Oxygen Supplementation: face mask  Level of Supplemental Oxygen (L/min / FiO2): 6  Independent Airway: airway patency satisfactory and stable  Dentition: dentition unchanged  Vital Signs Stable: post-procedure vital signs reviewed and stable  Report to RN Given: handoff report given  Patient transferred to: PACU    Handoff Report: Identifed the Patient, Identified the Reponsible Provider, Reviewed the pertinent medical history, Discussed the surgical course, Reviewed Intra-OP anesthesia mangement and issues during anesthesia, Set expectations for post-procedure period and Allowed opportunity for questions and acknowledgement of understanding    Vitals:  Vitals Value Taken Time   /66 07/12/24 1311   Temp     Pulse 81 07/12/24 1315   Resp     SpO2 97 % 07/12/24 1315   Vitals shown include unfiled device data.    Electronically Signed By: PRESTON Fu CRNA  July 12, 2024  1:16 PM

## 2024-07-12 NOTE — ANESTHESIA PROCEDURE NOTES
Airway       Patient location during procedure: OR       Procedure Start/Stop Times: 7/12/2024 11:18 AM  Staff -        CRNA: Guevara Cowan APRN CRNA       Performed By: CRNA  Consent for Airway        Urgency: elective  Indications and Patient Condition       Indications for airway management: taiwo-procedural       Induction type:intravenous       Mask difficulty assessment: 1 - vent by mask    Final Airway Details       Final airway type: endotracheal airway       Successful airway: ETT - single and Oral  Endotracheal Airway Details        ETT size (mm): 7.0       Cuffed: yes       Successful intubation technique: video laryngoscopy       VL Blade Size: Flanagan 3       Grade View of Cords: 1       Adjucts: stylet       Position: Right       Measured from: lips       Secured at (cm): 20       Bite block used: None    Post intubation assessment        Placement verified by: capnometry        Number of attempts at approach: 1       Number of other approaches attempted: 0       Secured with: tape       Ease of procedure: easy       Dentition: Intact    Medication(s) Administered   Medication Administration Time: 7/12/2024 11:18 AM

## 2024-07-12 NOTE — DISCHARGE INSTRUCTIONS
HOME CARE FOLLOWING ABDOMINAL SURGERY    INCISIONAL CARE:  Replace the bandage over your incision (or incisions) until all drainage stops, or if more comfortable to have in place.  If present, leave the steri-strips (white paper tapes) in place for 14 days after surgery.  If you have staples in your incision at the time of discharge, they will be removed at your follow-up appointment.  If Dermabond (a type of skin glue) is present, leave in place until it wears/flakes off.     BATHING:  Avoid baths for 1 week after surgery.  Showers are okay.  You may wash your hair at any time.  Gently pat your incision dry after bathing.    ACTIVITY:  Light Activity -- you may immediately be up and about as tolerated.  Driving -- you may drive when comfortable and off narcotic pain medications (example: Norco, Percocet, Hydrocodone).  Light Work -- resume when comfortable off pain medications.  (If you can drive, you probably can work.)  Strenuous Work/Activity -- limit lifting to 20 pounds for 4 weeks.  Then, progressively increase with time.  Active Sports (running, biking, etc.) -- cautiously resume after 6 weeks.  Most importantly listen to your body.  If an activity causes pain or discomfort please stop and try in a few days or decrease your intensity.    DISCOMFORT:  Use pain medications as prescribed by your surgeon.  Take the pain medication with some food, when possible, to minimize side effects.  Expect gradual improvement.      Narcotic Pain Medications:  Do not drive, make important decisions or operate heavy machinery while on narcotic pain medications.  Narcotic pain medications can be associated with nausea, sleep disturbance, and constipation.  Unless directed otherwise, please take an over the counter stool softener (Colace 100mg twice a day) unless directed otherwise.  As soon as you are able to stop narcotic pain medications the better. Long term use of narcotics is associated with tolerance (the need for more  medication for effect) and potential addiction.    DIET:  Return to diet you were on before surgery, unless you are given specific diet instructions.  Drink plenty of fluids.  While taking pain medications, increase dietary fiber or add a fiber supplementation like Metamucil or Citrucel to help prevent constipation - a possible side effect of pain medications.    NAUSEA:  If nauseated from the anesthetic/pain meds; rest in bed, get up cautiously with assistance, and drink clear liquids (juice, tea, broth).    RETURN APPOINTMENT:  Schedule a follow-up visit 2-3 weeks after discharge from the hospital.  Office Phone: [phone]     CONTACT US IF THE FOLLOWING DEVELOPS:   1. A fever that is above 101     2. If there is a large amount of drainage, bleeding, or swelling.   3. Severe pain that is not relieved by your prescription.   4. Drainage that is thick, cloudy, yellow, green or white.   5. Any other questions not answered by  Frequently Asked Questions  sheet.      FREQUENTLY ASKED QUESTIONS:    Q:  How should my incision look?    A:  Normally your incision will appear slightly swollen with light redness directly along the incision itself as it heals.  It may feel like a bump or ridge as the healing/scarring happens, and over time (3-4 months) this bump or ridge feeling should slowly go away.  In general, clear or pink watery drainage can be normal at first as your incision heals, but should decrease over time.    Q:  How do I know if my incision is infected?  A:  Look at your incision for signs of infection, like redness around the incision spreading to surrounding skin, or drainage of cloudy or foul-smelling drainage.  If you feel warm, check your temperature to see if you are running a fever.    **If any of these things occur, please notify the nurse at our office.  We may need you to come into the office for an incision check.      Q:  How do I take care of my incision?  A:  If you have a dressing in place -  Starting the day after surgery, replace the dressing 1-2 times a day until there is no further drainage from the incision.  At that time, a dressing is no longer needed.  Try to minimize tape on the skin if irritation is occurring at the tape sites.  If you have significant irritation from tape on the skin, please call the office to discuss other method of dressing your incision.    Small pieces of tape called  steri-strips  may be present directly overlying your incision; these may be removed 10 days after surgery unless otherwise specified by your surgeon.  If these tapes start to loosen at the ends, you may trim them back until they fall off or are removed.    A:  If you had  Dermabond  tissue glue used as a dressing (this causes your incision to look shiny with a clear covering over it) - This type of dressing wears off with time and does not require more dressings over the top unless it is draining around the glue as it wears off.  Do not apply ointments or lotions over the incisions until the glue has completely worn off.    Q:  There is a piece of tape or a sticky  lead  still on my skin.  Can I remove this?  A:  Sometimes the sticky  leads  used for monitoring during surgery or for evaluation in the emergency department are not all removed while you are in the hospital.  These sometimes have a tab or metal dot on them.  You can easily remove these on your own, like taking off a band-aid.  If there is a gel substance under the  lead , simply wipe/clean it off with a washcloth or paper towel.      Q:  What can I do to minimize constipation (very hard stools, or lack of stools)?  A:  Stay well hydrated.  Increase your dietary fiber intake or take a fiber supplement -with plenty of water.  Walk around frequently.  You may consider an over-the-counter stool-softener.  Your Pharmacist can assist you with choosing one that is stocked at your pharmacy.  Constipation is also one of the most common side effects of  pain medication.  If you are using pain medication, be pro-active and try to PREVENT problems with constipation by taking the steps above BEFORE constipation becomes a problem.    Q:  What do I do if I need more pain medications?  A:  Call the office to receive refills.  Be aware that certain pain meds cannot be called into a pharmacy and actually require a paper prescription.  A change may be made in your pain med as you progress thru your recovery period or if you have side effects to certain meds.    --Pain meds are NOT refilled after 5pm on weekdays, and NOT AT ALL on the weekends, so please look ahead to prevent problems.      Q:  Why am I having a hard time sleeping now that I am at home?  A:  Many medications you receive while you are in the hospital can impact your sleep for a number of days after your surgery/hospitalization.  Decreased level of activity and naps during the day may also make sleeping at night difficult.  Try to minimize day-time naps, and get up frequently during the day to walk around your home during your recovery time.  Sleep aides may be of some help, but are not recommended for long-term use.      Q:  I am having some back discomfort.  What should I do?  A:  This may be related to certain positioning that was required for your surgery, extended periods of time in bed, or other changes in your overall activity level.  You may try ice, heat, acetaminophen, or ibuprofen to treat this temporarily.  Note that many pain medications have acetaminophen in them and would state this on the prescription bottle.  Be sure not to exceed the maximum of 4000mg per day of acetaminophen.     **If the pain you are having does not resolve, is severe, or is a flare of back pain you have had on other occasions prior to surgery, please contact your primary physician for further recommendations or for an appointment to be examined at their office.    Q:  Why am I having headaches?  A:  Headaches can be caused  by many things:  caffeine withdrawal, use of pain meds, dehydration, high blood pressure, lack of sleep, over-activity/exhaustion, flare-up of usual migraine headaches.  If you feel this is related to muscle tension (a band-like feeling around the head, or a pressure at the low-back of the head) you may try ice or heat to this area.  You may need to drink more fluids (try electrolyte drink like Gatorade), rest, or take your usual migraine medications.   **If your headaches do not resolve, worsen, are accompanied by other symptoms, or if your blood pressure is high, please call your primary physician for recommendation and/or examination.    Q:  I am unable to urinate.  What do I do?  A:  A small percentage of people can have difficulty urinating initially after surgery.  This includes being able to urinate only a very small amount at a time and feeling discomfort or pressure in the very low abdomen.  This is called  urinary retention , and is actually an urgent situation.  Proceed to your nearest Emergency department for evaluation (not an Urgent Care Center).  Sometimes the bladder does not work correctly after certain medications you receive during surgery, or related to certain procedures.  You may need to have a catheter placed until your bladder recovers.  When planning to go to an Emergency department, it may help to call the ER to let them know you are coming in for this problem after a surgery.  This may help you get in quicker to be evaluated.  **If you have symptoms of a urinary tract infection, please contact your primary physician for the proper evaluation and treatment.          If you have other questions, please call the office Monday thru Friday between 8am and 5pm to discuss with the nurse.  # [phone]    There is a surgeon ON CALL on weekday evenings and over the weekend in case of urgent need only, and may be contacted at the same number.    If you are having an emergency, call 911 or proceed  to your nearest emergency department.                          Same Day Surgery Discharge Instructions  Special Precautions After Surgery - Adult    It is not unusual to feel lightheaded or faint, up to 24 hours after surgery or while taking pain medication.  If you have these symptoms; sit for a few minutes before standing and have someone assist you when getting up.  You should rest and relax for the next 24 hours and must have someone stay with you for at least 24 hours after your discharge.  DO NOT DRIVE any vehicle or operate mechanical equipment for 24 hours following the end of your surgery.  DO NOT DRIVE while taking narcotic pain medications that have been prescribed by your physician.  If you had a limb operated on, you must be able to use it fully to drive.  DO NOT drink alcoholic beverages for 24 hours following surgery or while taking prescription pain medication.  Drink clear liquids (apple juice, ginger ale, broth, 7-Up, etc.).  Progress to your regular diet as you feel able.  Any questions call your physician and do not make important decisions for 24 hours.    Nausea and Vomiting: Nausea and vomiting can occur any time after receiving anesthesia. If you experience nausea and vomiting we encourage you to move to a clear liquid diet and advance your diet as tolerated. If nausea and vomiting do not improve within 12 hours please call the surgeon or present to the Emergency department.     Break-through Bleeding: If your experience bleeding from your surgical site apply pressure and additional dressing per nurse instruction. For simple problems such as a saturated dressing, you may need to reinforce the dressing with more gauze and tape and put slight pressure on the site. If bleeding does not subside contact the surgeon or present to the Emergency Department.    Post-op Infection: If you develop a fever of 100.4 or greater, have pus like drainage, redness, swelling or severe pain at the surgical site  not alleviated with pain medications; please contact the surgeon or present to the Emergency Department.     Medications:  975 mg Acetaminophen (Tylenol) @ 0900:  Next dose: 3:00 pm.  Ibuprofen (Motrin, Advil) @ 1245:  Next dose: 6:45 pm.  5 mg Oxycodone at 2:00 pm:  Next dose: 8:00 pm.  Follow the instructions on the bottle.  __________________________________________________________________________________________________________________________________

## 2024-07-12 NOTE — ANESTHESIA POSTPROCEDURE EVALUATION
Patient: Liat Gold    Procedure: Procedure(s):  CHOLECYSTECTOMY, LAPAROSCOPIC       Anesthesia Type:  General    Note:  Disposition: Outpatient   Postop Pain Control: Uneventful            Sign Out: Well controlled pain   PONV: No   Neuro/Psych: Uneventful            Sign Out: Acceptable/Baseline neuro status   Airway/Respiratory: Uneventful            Sign Out: Acceptable/Baseline resp. status   CV/Hemodynamics: Uneventful            Sign Out: Acceptable CV status; No obvious hypovolemia; No obvious fluid overload   Other NRE: NONE   DID A NON-ROUTINE EVENT OCCUR? No           Last vitals:  Vitals Value Taken Time   /70 07/12/24 1330   Temp 36.8  C (98.2  F) 07/12/24 1330   Pulse 69 07/12/24 1339   Resp 14 07/12/24 1315   SpO2 97 % 07/12/24 1339   Vitals shown include unfiled device data.    Electronically Signed By: PRESTON Fu CRNA  July 12, 2024  2:27 PM

## 2024-07-16 LAB
PATH REPORT.COMMENTS IMP SPEC: NORMAL
PATH REPORT.COMMENTS IMP SPEC: NORMAL
PATH REPORT.FINAL DX SPEC: NORMAL
PATH REPORT.GROSS SPEC: NORMAL
PATH REPORT.MICROSCOPIC SPEC OTHER STN: NORMAL
PATH REPORT.RELEVANT HX SPEC: NORMAL
PHOTO IMAGE: NORMAL

## 2024-07-19 ENCOUNTER — OFFICE VISIT (OUTPATIENT)
Dept: FAMILY MEDICINE | Facility: CLINIC | Age: 39
End: 2024-07-19
Payer: COMMERCIAL

## 2024-07-19 VITALS
HEIGHT: 62 IN | RESPIRATION RATE: 20 BRPM | DIASTOLIC BLOOD PRESSURE: 68 MMHG | HEART RATE: 68 BPM | BODY MASS INDEX: 27.6 KG/M2 | OXYGEN SATURATION: 98 % | SYSTOLIC BLOOD PRESSURE: 102 MMHG | WEIGHT: 150 LBS | TEMPERATURE: 98 F

## 2024-07-19 DIAGNOSIS — Z90.49 S/P LAPAROSCOPIC CHOLECYSTECTOMY: Primary | ICD-10-CM

## 2024-07-19 PROCEDURE — 99212 OFFICE O/P EST SF 10 MIN: CPT | Performed by: NURSE PRACTITIONER

## 2024-07-19 RX ORDER — OXYCODONE AND ACETAMINOPHEN 5; 325 MG/1; MG/1
1 TABLET ORAL EVERY 6 HOURS PRN
COMMUNITY
Start: 2024-07-10

## 2024-07-19 ASSESSMENT — PAIN SCALES - GENERAL: PAINLEVEL: NO PAIN (0)

## 2024-07-19 NOTE — PROGRESS NOTES
"  Assessment & Plan     S/P laparoscopic cholecystectomy  Patient is status postcholecystectomy 7 days ago.  Incisions are healing well, no signs or symptoms of infection.  Having good bowel movements, eating well.  No surgical follow-up noted.  Per patient was advised does not need follow-up.  Patient wanting FMLA forms filled out, will send to surgeon as primary care uncertain on restrictions.        MED REC REQUIRED  Post Medication Reconciliation Status: discharge medications reconciled, continue medications without change  Nicotine/Tobacco Cessation  She reports that she has been smoking cigarettes. She has never used smokeless tobacco.  Nicotine/Tobacco Cessation Plan  Information offered: Patient not interested at this time      BMI  Estimated body mass index is 27.44 kg/m  as calculated from the following:    Height as of this encounter: 1.575 m (5' 2\").    Weight as of this encounter: 68 kg (150 lb).   Weight management plan: Discussed healthy diet and exercise guidelines      See Patient Instructions    uYlissa Josue is a 39 year old, presenting for the following health issues:  Hospital F/U (Surgery )        7/19/2024     2:28 PM   Additional Questions   Roomed by Hailey CELESTIN LPN   Accompanied by Self     History of Present Illness       Reason for visit:  Gallbladder surgery followup and FMLA paperwork    She eats 4 or more servings of fruits and vegetables daily.She consumes 5 sweetened beverage(s) daily.She exercises with enough effort to increase her heart rate 30 to 60 minutes per day.  She exercises with enough effort to increase her heart rate 5 days per week.   She is taking medications regularly.           Hospital Follow-up Visit:    Hospital/Nursing Home/IP Rehab Facility: [facility]  Date of Admission: 7/12/24  Date of Discharge: 7/12/24   Reason(s) for Admission: Acute Cholecystitis  Was the patient in the ICU or did the patient experience delirium during " "hospitalization?  No  Do you have any other stressors you would like to discuss with your provider? FMLA Paperwork, Diet- Having frequent loose stools, hiccups    Problems taking medications regularly:  None  Medication changes since discharge: Started on Pain medications  Problems adhering to non-medication therapy:  None    Summary of hospitalization:  [facility] discharge summary reviewed  Diagnostic Tests/Treatments reviewed.  Follow up needed: none  Other Healthcare Providers Involved in Patient s Care:         Surgical follow-up appointment - None needed per patient   Update since discharge: improved.         Plan of care communicated with patient                 Review of Systems  Constitutional, HEENT, cardiovascular, pulmonary, gi and gu systems are negative, except as otherwise noted.      Objective    /68   Pulse 68   Temp 98  F (36.7  C) (Tympanic)   Resp 20   Ht 1.575 m (5' 2\")   Wt 68 kg (150 lb)   LMP 07/04/2024 (Approximate)   SpO2 98%   BMI 27.44 kg/m    Body mass index is 27.44 kg/m .  Physical Exam   GENERAL: alert and no distress  NECK: no adenopathy, no asymmetry, masses, or scars  RESP: lungs clear to auscultation - no rales, rhonchi or wheezes  CV: regular rate and rhythm, normal S1 S2, no S3 or S4, no murmur, click or rub, no peripheral edema  ABDOMEN: soft, mildly tender around incisions, no hepatosplenomegaly, no masses and bowel sounds normal  MS: no gross musculoskeletal defects noted, no edema  SKIN: incisions on abdomen well approximated with dermabond, ecchymosis most notable around umbilical incision without drainage   PSYCH: mentation appears normal, affect normal/bright    Diagnostic Test Results:  Labs reviewed in Epic  none          Signed Electronically by: Brady Burdick DNP,, PRESTON CNP      Chart documentation with Dragon Voice recognition Software. Although reviewed after completion, some words and grammatical errors may remain.   "

## 2024-07-22 ENCOUNTER — TELEPHONE (OUTPATIENT)
Dept: SURGERY | Facility: CLINIC | Age: 39
End: 2024-07-22
Payer: COMMERCIAL

## 2024-07-22 NOTE — TELEPHONE ENCOUNTER
Reason for Call:  Form, our goal is to have forms completed with 72 hours, however, some forms may require a visit or additional information.    Type of letter, form or note:  FMLA    Who is the form from?: Patient    Where did the form come from: form was faxed in    What clinic location was the form placed at?: [facility] (ENT, Neurology, Rheumatology, Surgery, Urology, Vascular Surgery)    Where the form was placed: Given to physician    What number is listed as a contact on the form?:        Additional comments: Forms can be faxed to [phone] Attn: Leave  [place]    Call taken on 7/22/2024 at 2:28 PM by Niki Wagner MA

## 2024-07-22 NOTE — TELEPHONE ENCOUNTER
Spoke to pt and discussed her request for time off. Per her request once completed fax to [phone]    Ina Croswell   [facility] [place]   [facility]  [phone]

## 2024-07-23 NOTE — TELEPHONE ENCOUNTER
Forms completed and signed by provider. Forms faxed to [phone] per pt request.    1 copy sent to scanning, 1 copy in drawer.   Ina Wagner   [facility] [place]   [facility]  [phone]

## 2024-12-31 ENCOUNTER — VIRTUAL VISIT (OUTPATIENT)
Dept: FAMILY MEDICINE | Facility: CLINIC | Age: 39
End: 2024-12-31
Payer: COMMERCIAL

## 2024-12-31 DIAGNOSIS — J06.9 UPPER RESPIRATORY INFECTION, VIRAL: ICD-10-CM

## 2024-12-31 DIAGNOSIS — A08.11 NOROVIRUS: Primary | ICD-10-CM

## 2024-12-31 DIAGNOSIS — E86.0 DEHYDRATION: ICD-10-CM

## 2024-12-31 PROCEDURE — 99213 OFFICE O/P EST LOW 20 MIN: CPT | Mod: 95 | Performed by: INTERNAL MEDICINE

## 2024-12-31 ASSESSMENT — PATIENT HEALTH QUESTIONNAIRE - PHQ9
SUM OF ALL RESPONSES TO PHQ QUESTIONS 1-9: 0
SUM OF ALL RESPONSES TO PHQ QUESTIONS 1-9: 0
10. IF YOU CHECKED OFF ANY PROBLEMS, HOW DIFFICULT HAVE THESE PROBLEMS MADE IT FOR YOU TO DO YOUR WORK, TAKE CARE OF THINGS AT HOME, OR GET ALONG WITH OTHER PEOPLE: NOT DIFFICULT AT ALL

## 2024-12-31 ASSESSMENT — ANXIETY QUESTIONNAIRES
4. TROUBLE RELAXING: NOT AT ALL
GAD7 TOTAL SCORE: 0
3. WORRYING TOO MUCH ABOUT DIFFERENT THINGS: NOT AT ALL
GAD7 TOTAL SCORE: 0
5. BEING SO RESTLESS THAT IT IS HARD TO SIT STILL: NOT AT ALL
GAD7 TOTAL SCORE: 0
6. BECOMING EASILY ANNOYED OR IRRITABLE: NOT AT ALL
1. FEELING NERVOUS, ANXIOUS, OR ON EDGE: NOT AT ALL
7. FEELING AFRAID AS IF SOMETHING AWFUL MIGHT HAPPEN: NOT AT ALL
2. NOT BEING ABLE TO STOP OR CONTROL WORRYING: NOT AT ALL
7. FEELING AFRAID AS IF SOMETHING AWFUL MIGHT HAPPEN: NOT AT ALL

## 2024-12-31 NOTE — LETTER
REPORT OF WORK ABILITY    [address] [address]  [phone]      PATIENT DATA    Employee Name: Liat Gold      : 1985     SS#: [id number]    Today's date: 2024  Date of first visit: 2024    PROVIDER EVALUATION: Please fill in as needed.  Please give copy to employee for employer.    1. Diagnosis: Norovirus gastroenteritis                        Acute upper respiratory tract infection, viral                        Dehydration    2. No work from 2024 to 2024.  3. Return to work date: 2025   ** WITH RESTRICTIONS? No restrictions    Maximum Medical Improvement (Date): 2025  Any Permanent Partial Disability? 0%    Medical Examiner: Jason Altamirano MD          [id number]    Next appointment: As needed    CC: Employer, Managed Care Plan/Payor, Patient

## 2024-12-31 NOTE — PROGRESS NOTES
Liat is a 39 year old who is being evaluated via a billable video visit.    How would you like to obtain your AVS? MyChart  If the video visit is dropped, the invitation should be resent by: Text to cell phone: [phone]  Will anyone else be joining your video visit? No    [facility] Internal Medicine Progress Note           Assessment    (A08.11) Norovirus  (primary encounter diagnosis)    (J06.9) Upper respiratory infection, viral    (E86.0) Dehydration          Plan   Patient recovered from a bout of Norovirus gastroenteritis without complications such as acute kidney injury. However, her clinical course was complicated when she later developed an acute viral upper respiratory tract infection. No history of chronic lung disease.  Work ability drafted, with off work dates from 12/18/2024 to 12/31/2024 and return to work on 1/2/2025 without restrictions.        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    Respiratory symptoms  Duration: one  Description  nasal congestion, rhinorrhea, cough, and fever  Severity: mild  Accompanying signs and symptoms: None  History (predisposing factors):  exposure to an ill child  Precipitating or alleviating factors: None  Therapies tried and outcome:  none . Patient is requesting work ability letter as she missed work from 12/18/2024-12/31/2024.           Significant Problems:     Patient Active Problem List   Diagnosis    Severe dysplasia of cervix (ALISSA III)    Smoker    Restless legs    Female pelvic pain     Anxiety    CARDIOVASCULAR SCREENING; LDL GOAL LESS THAN 160    Arthritis    Opioid dependence in remission (H)              Review of Systems:     CONSTITUTIONAL: NEGATIVE for fever, chills, change in weight  INTEGUMENTARY/SKIN: NEGATIVE for worrisome rashes, moles or lesions  EYES: NEGATIVE for vision changes or irritation  ENT/MOUTH: NEGATIVE for epistaxis and vertigo  RESP:NEGATIVE for hemoptysis  BREAST: NEGATIVE for masses, tenderness or discharge  CV: NEGATIVE for chest pain, palpitations or peripheral edema  GI: POSITIVE for diarrhea and NEGATIVE for hematemesis, hematochezia, and jaundice  : NEGATIVE for frequency, dysuria, or hematuria  MUSCULOSKELETAL: NEGATIVE for significant arthralgias or myalgia  NEURO: NEGATIVE for weakness, dizziness or paresthesias  ENDOCRINE: NEGATIVE for temperature intolerance, skin/hair changes  HEME: NEGATIVE for bleeding problems  PSYCHIATRIC: NEGATIVE for changes in mood or affect            Medications:     Current Outpatient Medications   Medication Sig Dispense Refill    acetaminophen (TYLENOL) 325 MG tablet Take 1-2 tablets (325-650 mg) by mouth every 6 hours as needed for mild pain 50 tablet 0    ibuprofen (ADVIL/MOTRIN) 800 MG tablet Take 1 tablet (800 mg) by mouth every 6 hours as needed for other (mild and/or inflammatory pain) 30 tablet 0    ondansetron (ZOFRAN ODT) 4 MG ODT tab Take 1 tablet (4 mg) by mouth every 8 hours as needed for nausea 10 tablet 0    ondansetron (ZOFRAN ODT) 4 MG ODT tab DISSOLVE 1 TABLET IN MOUTH TWICE DAILY AS NEEDED 30 tablet 1    senna-docusate (SENOKOT-S/PERICOLACE) 8.6-50 MG tablet 2 tabs bid 120 tablet 0    ** PATIENT ALERT ** DO NOT EXCEED 4,000 MG OF ACETAMINOPHEN FROM ALL SOURCES IN 24 HOURS (Patient not taking: Reported on 12/31/2024)      oxyCODONE-acetaminophen (PERCOCET) 5-325 MG tablet Take 1 tablet by mouth every 6 hours as needed (Patient not taking: Reported on 12/31/2024)       No current facility-administered medications  for this visit.             Physical Exam:   Vital signs and physical exam not obtained due to nature of visit.          Data:   Epic reviewed.     Disposition:  Follow-up in two weeks or as needed.      Jason Altamirano MD  Internal Medicine  [facility] Team        Video-Visit Details     Type of service:  Video Visit  Video Start Time: 2:40 PM  Video End Time: 2:55 PM  Originating Location (pt. Location): Home   Distant Location (provider location):  [facility]   Platform used for Video Visit: [employer]

## 2025-01-04 ENCOUNTER — HEALTH MAINTENANCE LETTER (OUTPATIENT)
Age: 40
End: 2025-01-04

## 2025-03-28 ENCOUNTER — OFFICE VISIT (OUTPATIENT)
Dept: FAMILY MEDICINE | Facility: CLINIC | Age: 40
End: 2025-03-28
Payer: COMMERCIAL

## 2025-03-28 VITALS
WEIGHT: 168.6 LBS | TEMPERATURE: 97.9 F | RESPIRATION RATE: 16 BRPM | HEART RATE: 83 BPM | BODY MASS INDEX: 30.84 KG/M2 | DIASTOLIC BLOOD PRESSURE: 68 MMHG | OXYGEN SATURATION: 99 % | SYSTOLIC BLOOD PRESSURE: 110 MMHG

## 2025-03-28 DIAGNOSIS — G44.209 TENSION HEADACHE: Primary | ICD-10-CM

## 2025-03-28 DIAGNOSIS — F43.29 STRESS AND ADJUSTMENT REACTION: ICD-10-CM

## 2025-03-28 DIAGNOSIS — F11.21 OPIOID DEPENDENCE IN REMISSION (H): ICD-10-CM

## 2025-03-28 DIAGNOSIS — J00 ACUTE RHINITIS: ICD-10-CM

## 2025-03-28 LAB
ANION GAP SERPL CALCULATED.3IONS-SCNC: 10 MMOL/L (ref 7–15)
BUN SERPL-MCNC: 7.6 MG/DL (ref 6–20)
CALCIUM SERPL-MCNC: 9.2 MG/DL (ref 8.8–10.4)
CHLORIDE SERPL-SCNC: 101 MMOL/L (ref 98–107)
CREAT SERPL-MCNC: 0.77 MG/DL (ref 0.51–0.95)
EGFRCR SERPLBLD CKD-EPI 2021: >90 ML/MIN/1.73M2
ERYTHROCYTE [DISTWIDTH] IN BLOOD BY AUTOMATED COUNT: 13.1 % (ref 10–15)
FERRITIN SERPL-MCNC: 84 NG/ML (ref 6–175)
GLUCOSE SERPL-MCNC: 76 MG/DL (ref 70–99)
HCO3 SERPL-SCNC: 25 MMOL/L (ref 22–29)
HCT VFR BLD AUTO: 40.3 % (ref 35–47)
HGB BLD-MCNC: 13.1 G/DL (ref 11.7–15.7)
MCH RBC QN AUTO: 29 PG (ref 26.5–33)
MCHC RBC AUTO-ENTMCNC: 32.5 G/DL (ref 31.5–36.5)
MCV RBC AUTO: 89 FL (ref 78–100)
PLATELET # BLD AUTO: 258 10E3/UL (ref 150–450)
POTASSIUM SERPL-SCNC: 4.1 MMOL/L (ref 3.4–5.3)
RBC # BLD AUTO: 4.52 10E6/UL (ref 3.8–5.2)
SODIUM SERPL-SCNC: 136 MMOL/L (ref 135–145)
TSH SERPL DL<=0.005 MIU/L-ACNC: 1.78 UIU/ML (ref 0.3–4.2)
WBC # BLD AUTO: 7.5 10E3/UL (ref 4–11)

## 2025-03-28 PROCEDURE — 1126F AMNT PAIN NOTED NONE PRSNT: CPT | Performed by: NURSE PRACTITIONER

## 2025-03-28 PROCEDURE — 3074F SYST BP LT 130 MM HG: CPT | Performed by: NURSE PRACTITIONER

## 2025-03-28 PROCEDURE — G2211 COMPLEX E/M VISIT ADD ON: HCPCS | Performed by: NURSE PRACTITIONER

## 2025-03-28 PROCEDURE — 85027 COMPLETE CBC AUTOMATED: CPT | Performed by: NURSE PRACTITIONER

## 2025-03-28 PROCEDURE — 80048 BASIC METABOLIC PNL TOTAL CA: CPT | Performed by: NURSE PRACTITIONER

## 2025-03-28 PROCEDURE — 82728 ASSAY OF FERRITIN: CPT | Performed by: NURSE PRACTITIONER

## 2025-03-28 PROCEDURE — 82306 VITAMIN D 25 HYDROXY: CPT | Performed by: NURSE PRACTITIONER

## 2025-03-28 PROCEDURE — 36415 COLL VENOUS BLD VENIPUNCTURE: CPT | Performed by: NURSE PRACTITIONER

## 2025-03-28 PROCEDURE — 3078F DIAST BP <80 MM HG: CPT | Performed by: NURSE PRACTITIONER

## 2025-03-28 PROCEDURE — 99214 OFFICE O/P EST MOD 30 MIN: CPT | Performed by: NURSE PRACTITIONER

## 2025-03-28 PROCEDURE — 84443 ASSAY THYROID STIM HORMONE: CPT | Performed by: NURSE PRACTITIONER

## 2025-03-28 RX ORDER — CYCLOBENZAPRINE HCL 5 MG
5-10 TABLET ORAL 3 TIMES DAILY PRN
Qty: 30 TABLET | Refills: 0 | Status: SHIPPED | OUTPATIENT
Start: 2025-03-28

## 2025-03-28 RX ORDER — FLUTICASONE PROPIONATE 50 MCG
2 SPRAY, SUSPENSION (ML) NASAL DAILY
Qty: 16 G | Refills: 1 | Status: SHIPPED | OUTPATIENT
Start: 2025-03-28

## 2025-03-28 ASSESSMENT — ENCOUNTER SYMPTOMS: HEADACHES: 1

## 2025-03-28 ASSESSMENT — PAIN SCALES - GENERAL: PAINLEVEL_OUTOF10: NO PAIN (0)

## 2025-03-28 NOTE — PROGRESS NOTES
"  Assessment & Plan     Tension headache  ***  - cyclobenzaprine (FLEXERIL) 5 MG tablet; Take 1-2 tablets (5-10 mg) by mouth 3 times daily as needed for other (headache).  - TSH with free T4 reflex; Future  - CBC with platelets; Future  - Ferritin; Future  - Vitamin D Deficiency; Future  - Basic metabolic panel  (Ca, Cl, CO2, Creat, Gluc, K, Na, BUN); Future  - TSH with free T4 reflex  - CBC with platelets  - Ferritin  - Vitamin D Deficiency  - Basic metabolic panel  (Ca, Cl, CO2, Creat, Gluc, K, Na, BUN)    Acute rhinitis  ***  - fluticasone (FLONASE) 50 MCG/ACT nasal spray; Spray 2 sprays into both nostrils daily.    Stress and adjustment reaction  ***    Opioid dependence in remission (H)  ***           BMI  Estimated body mass index is 30.84 kg/m  as calculated from the following:    Height as of 7/19/24: 1.575 m (5' 2\").    Weight as of this encounter: 76.5 kg (168 lb 9.6 oz).   Weight management plan: Discussed healthy diet and exercise guidelines      See Patient Instructions    Yulissa Josue is a 40 year old, presenting for the following health issues:  Headache and Dizziness        3/28/2025    10:27 AM   Additional Questions   Roomed by Marly MARTINEZ([facility])     Physical with pap schedule for 4/25/25      History of Present Illness       Headaches:   Since the patient's last clinic visit, headaches are: worsened  The patient is getting headaches:  2x a week  She is not able to do normal daily activities when she has a migraine.  The patient is taking the following rescue/relief medications:  Ibuprofen (Advil, Motrin) and Excedrin   Patient states \"I get no relief\" from the rescue/relief medications.   The patient is taking the following medications to prevent migraines:  No medications to prevent migraines  In the past 4 weeks, the patient has gone to an Urgent Care or Emergency Room 0 times times due to headaches.   She is taking medications regularly.        No headache history   Started 3-4 weeks " ago  Intense ~ go from 0-100 quickly, usually within 45 minutes is severe. With vomiting  Starts in the back of her head  Dizziness started about the same time   Checked blood sugars at work, were good   More stress in the last few months  Ibuprofen and Excedrin not helpful at all     Periods heavier than they have been in her life; used to be able to wear a carefree pad, now having to wear a large pad  Was anemic during pregnancy and had similar symptoms with less headache     Also dealing with dry-heat     Dizziness  Onset/Duration: 3 weeks to a month getting worse as time goes by   Description:   Do you feel faint: YES with tunnel vision  Does it feel like the surroundings (bed, room) are moving: YES  Unsteady/off balance: YES  Have you passed out or fallen: No  Intensity: moderate  Progression of Symptoms: worsening and intermittent  Accompanying Signs & Symptoms:  Heart palpitations or chest pain: No  Nausea, vomiting: YES  Weakness or lack of coordination in arms or legs: YES- tingling and feeling weak   Vision or speech changes: No  Numbness or tingling: YES  Ringing in ears (Tinnitus): No  Hearing Loss: No  History:   Head trauma/concussion history: No  Previous similar symptoms: YES- when was pregnant and was  anemic   Recent bleeding history: YES- sometimes on the stool   Any new medications (BP?): No  Precipitating factors:   Worse with activity: No  Worse with head movement: No  Alleviating factors:   Does staying in a fixed position give relief: YES  Therapies tried and outcome: None    Random ~ just at work  Hasn't passed out   Is eating okay, drinks plenty of fluids   Hasn't had a vision exam in a long time  Has had a little more runny nose   No chest pain, pressure or shortness of breath     Review of Systems  Constitutional, HEENT, cardiovascular, pulmonary, gi and gu systems are negative, except as otherwise noted.      Objective    /68   Pulse 83   Temp 97.9  F (36.6  C) (Tympanic)   Resp  16   Wt 76.5 kg (168 lb 9.6 oz)   LMP 02/15/2025 (Approximate)   SpO2 99%   BMI 30.84 kg/m    Body mass index is 30.84 kg/m .  Physical Exam   GENERAL: alert and no distress  NECK: no adenopathy, no asymmetry, masses, or scars  RESP: lungs clear to auscultation - no rales, rhonchi or wheezes  CV: regular rate and rhythm, normal S1 S2, no S3 or S4, no murmur, click or rub, no peripheral edema  ABDOMEN: soft, nontender, no hepatosplenomegaly, no masses and bowel sounds normal  MS: no gross musculoskeletal defects noted, no edema  SKIN: no suspicious lesions or rashes  PSYCH: mentation appears normal, affect normal/bright, and tearful    Diagnostic Test Results:  Labs reviewed in Epic  Pending          Signed Electronically by: Alexia Abreu DNP  {Email feedback regarding this note to [email or web address]   :[id number]}    Chart documentation with Dragon Voice recognition Software. Although reviewed after completion, some words and grammatical errors may remain.    exam in a long time  Has had a little more runny nose   No chest pain, pressure or shortness of breath     Review of Systems  Constitutional, HEENT, cardiovascular, pulmonary, gi and gu systems are negative, except as otherwise noted.      Objective    /68   Pulse 83   Temp 97.9  F (36.6  C) (Tympanic)   Resp 16   Wt 76.5 kg (168 lb 9.6 oz)   LMP 02/15/2025 (Approximate)   SpO2 99%   BMI 30.84 kg/m    Body mass index is 30.84 kg/m .  Physical Exam   GENERAL: alert and no distress  NECK: no adenopathy, no asymmetry, masses, or scars  RESP: lungs clear to auscultation - no rales, rhonchi or wheezes  CV: regular rate and rhythm, normal S1 S2, no S3 or S4, no murmur, click or rub, no peripheral edema  ABDOMEN: soft, nontender, no hepatosplenomegaly, no masses and bowel sounds normal  MS: no gross musculoskeletal defects noted, no edema  NEURO: Normal strength and tone, mentation intact, speech normal, gait normal including heel/toe/tandem walking, cranial nerves 2-12 intact, Romberg negative, and nystagmus neg   SKIN: no suspicious lesions or rashes  PSYCH: mentation appears normal, affect normal/bright, and tearful    Diagnostic Test Results:  Labs reviewed in Epic  Pending          Signed Electronically by: Alexia Abreu, VERÓNICA      Chart documentation with Dragon Voice recognition Software. Although reviewed after completion, some words and grammatical errors may remain.

## 2025-03-28 NOTE — NURSING NOTE
"Chief Complaint   Patient presents with    Headache    Dizziness       Initial There were no vitals taken for this visit. Estimated body mass index is 27.44 kg/m  as calculated from the following:    Height as of 7/19/24: 1.575 m (5' 2\").    Weight as of 7/19/24: 68 kg (150 lb).    Patient presents to the clinic using No DME    Is there anyone who you would like to be able to receive your results? No  If yes have patient fill out AUDREY      "

## 2025-03-28 NOTE — PATIENT INSTRUCTIONS
Start Flexeril (muscle relaxer) as soon as a headache     Other counseling options  [facility] ([place])-- [phone]  [facility] ([place], [place]) -- [phone]  [facility] ([facility]) -- [phone]  [facility] ([place]) -- [phone]  [facility] ([place], other [facility] as well) -- [phone]  [facility] ([place]) -- [phone]  [facility] ([place], multiple locations) -- [phone]  [facility] ([facility], [facility] locations) -- [phone]     metabolic panel  (Ca, Cl, CO2, Creat, Gluc, K, Na, BUN)    Acute rhinitis  Acute sinus symptoms and fluid levels noted in bilateral ears.  Could be mildly contributing to symptoms above including dizziness.  Recommend patient start Flonase nasal spray, 2 sprays each nostril daily.  Reviewed proper administration instructions with patient.  Also recommend starting an over-the-counter antihistamine such as Claritin or Zyrtec once daily.  - fluticasone (FLONASE) 50 MCG/ACT nasal spray; Spray 2 sprays into both nostrils daily.    Stress and adjustment reaction  Significant stress.  Likely contributing to symptoms above.  Highly encourage and recommend counseling.  Resources provided.    Other counseling options  [facility] ([place])-- [phone]  [facility] ([facility]) -- [phone]  [facility]) -- [phone]  [facility] ([place]) -- [phone]  [facility] ([place], other [facility] as well) -- [phone]  [facility] ([place]) -- [phone]  [facility] ([place], multiple locations) -- [phone]  [facility] ([facility], [facility]) -- [phone]

## 2025-03-29 LAB — VIT D+METAB SERPL-MCNC: 12 NG/ML (ref 20–50)

## 2025-04-01 ENCOUNTER — MYC MEDICAL ADVICE (OUTPATIENT)
Dept: FAMILY MEDICINE | Facility: CLINIC | Age: 40
End: 2025-04-01
Payer: COMMERCIAL

## 2025-04-01 ENCOUNTER — TELEPHONE (OUTPATIENT)
Dept: FAMILY MEDICINE | Facility: CLINIC | Age: 40
End: 2025-04-01
Payer: COMMERCIAL

## 2025-04-01 DIAGNOSIS — E55.9 VITAMIN D DEFICIENCY: Primary | ICD-10-CM

## 2025-04-01 NOTE — TELEPHONE ENCOUNTER
Patient is asking for forms to be filled out. I will copy and give to [facility].    Maria Del Carmen Burdick,ANTONINA

## 2025-04-01 NOTE — TELEPHONE ENCOUNTER
Forms/Letter Request    Type of form/letter: FMLA - Intermittent     Number of days per episode:  1 x 2 wk  Number of episodes per month:  8    Do we have the form/letter: Yes: will [employer]    Who is the form from? work (if other please explain)    Where did/will the form come from? form was sent via [employer]    When is form/letter needed by: asap    How would you like the form/letter returned: Fax : will be on form    Patient Notified form requests are processed in 5-7 business days:Yes    Could we send this information to you in [employer] or would you prefer to receive a phone call?:   Patient would prefer a phone call   Okay to leave a detailed message?: Yes at Cell number on file:    Telephone Information:   Mobile [phone]

## 2025-04-01 NOTE — LETTER
April 10, 2025      Liat Gold  [id number]  [place] [address]        To Whom It May Concern:    Please excuse Liat from work until Monday, April 14, 2025 due to illness and recovery.        Sincerely,        Alexia Abreu DNP  Electronically signed

## 2025-04-02 NOTE — TELEPHONE ENCOUNTER
Forms rcvd. Intake Form and AUDREY sent to pt via [employer]. Waiting for forms to be returned.    FMLA paperwork at [facility]'s desk.    Adalgisa Boogie Patient

## 2025-04-14 NOTE — TELEPHONE ENCOUNTER
Forms completed and signed by Alexia. Faxed back. Copies made, sent to scanning.    Adalgisa Boogie Patient

## (undated) DEVICE — STOCKING SLEEVE COMPRESSION CALF LG

## (undated) DEVICE — SYSTEM LAPAROVUE VISIBILITY LAPVUE10

## (undated) DEVICE — SOL NACL 0.9% IRRIG 1000ML BOTTLE 07138-09

## (undated) DEVICE — ESU PENCIL W/COATED BLADE E2450H

## (undated) DEVICE — ADH SKIN CLOSURE PREMIERPRO EXOFIN 1.0ML 3470

## (undated) DEVICE — ESU HOLSTER PLASTIC DISP E2400

## (undated) DEVICE — DRAPE POUCH INSTRUMENT 3 POCKET 1018L

## (undated) DEVICE — SYR 10ML LL W/O NDL

## (undated) DEVICE — PACK LAPAROSCOPY/PELVISCOPY STD

## (undated) DEVICE — GLOVE BIOGEL PI ULTRATOUCH G SZ 8.0 42180

## (undated) DEVICE — ESU ELEC CLEANCOAT LAP FLAT L-HOOK 36CM E3774-36C

## (undated) DEVICE — ANTIFOG SOLUTION W/FOAM PAD 31142527

## (undated) DEVICE — SU MONOCRYL 4-0 PS-2 18" UND Y496G

## (undated) DEVICE — SUCTION MANIFOLD NEPTUNE 2 SYS 1 PORT 702-025-000

## (undated) DEVICE — CATH TRAY FOLEY SURESTEP 16FR W/URINE MTR STATLK LF A303416A

## (undated) DEVICE — GLOVE PROTEXIS BLUE W/NEU-THERA 7.0  2D73EB70

## (undated) DEVICE — Device

## (undated) DEVICE — ENDO TROCAR SLEEVE KII ADV FIXATION 05X100MM CFS02

## (undated) DEVICE — CLIP APPLIER ENDO ROTATING 10MM MED/LG ER320

## (undated) DEVICE — DECANTER VIAL 2006S

## (undated) DEVICE — GLOVE PROTEXIS MICRO 5.5  2D73PM55

## (undated) DEVICE — ENDO TROCAR FIRST ENTRY KII FIOS ADV FIX 11X100MM CFF33

## (undated) DEVICE — SOL NACL 0.9% IRRIG 3000ML BAG 07972-08

## (undated) DEVICE — SU VICRYL 3-0 SH 27" UND J416H

## (undated) DEVICE — TUBING C02 INSUFFLATION W/FILTER

## (undated) DEVICE — PREP CHLORAPREP 26ML TINTED ORANGE  260815

## (undated) DEVICE — SYR 20ML LL W/O NDL

## (undated) DEVICE — SYR 50ML LL W/O NDL 309653

## (undated) DEVICE — PAD PERI INDIV WRAP 11" 2022

## (undated) DEVICE — ENDO TROCAR FIRST ENTRY KII FIOS ADV FIX 05X100MM CFF03

## (undated) DEVICE — GOWN XLG DISP 9545

## (undated) DEVICE — NDL INSUFFLATION 13GA 120MM C2201

## (undated) DEVICE — SYR 05ML LL W/O NDL

## (undated) DEVICE — ENDO POUCH UNIV RETRIEVAL SYSTEM INZII 10MM CD001

## (undated) DEVICE — GLOVE BIOGEL PI MICRO SZ 7.0 48570

## (undated) DEVICE — GLOVE PROTEXIS BLUE W/NEU-THERA 6.5  2D73EB65

## (undated) DEVICE — GOWN LG DISP 9515

## (undated) DEVICE — SURGICEL HEMOSTAT 4X8" 1952

## (undated) DEVICE — GLOVE PROTEXIS BLUE W/NEU-THERA 6.0  2D73EB60

## (undated) DEVICE — PREP SKIN SCRUB TRAY 4461A

## (undated) DEVICE — ESU ENDO SCISSORS 5MM CVD 5DCS

## (undated) DEVICE — SOL WATER IRRIG 1000ML BOTTLE 07139-09

## (undated) DEVICE — ESU LIGASURE LAPAROSCOPIC BLUNT TIP SEALER 5MMX37CM LF1837

## (undated) DEVICE — GLOVE PROTEXIS MICRO 7.0  2D73PM70

## (undated) DEVICE — SUCTION IRR STRYKERFLOW II W/TIP 250-070-520

## (undated) DEVICE — PREP CHLORHEXIDINE 4% 4OZ (HIBICLENS) 57504

## (undated) DEVICE — DRSG GAUZE 2X2" 8042

## (undated) DEVICE — GLOVE BIOGEL PI MICRO SZ 7.5 48575

## (undated) DEVICE — GLOVE BIOGEL PI MICRO INDICATOR UNDERGLOVE SZ 8.0 48980

## (undated) DEVICE — BLADE KNIFE SURG 11 371111

## (undated) DEVICE — LABEL MEDICATION SYSTEM  3304

## (undated) DEVICE — SU DERMABOND ADVANCED .7ML DNX12

## (undated) DEVICE — GLOVE PROTEXIS W/NEU-THERA 6.5  2D73TE65

## (undated) DEVICE — ANTIFOG SOLUTION SEE SHARP 150M TROCAR SWABS 30978

## (undated) DEVICE — ESU CORD MONOPOLAR 10'  E0510

## (undated) DEVICE — SU VICRYL 0 UR-6 27" J603H

## (undated) DEVICE — DRSG TEGADERM 2 3/8X2 3/4" 1624W

## (undated) RX ORDER — ALBUTEROL SULFATE 90 UG/1
AEROSOL, METERED RESPIRATORY (INHALATION)
Status: DISPENSED
Start: 2022-05-04

## (undated) RX ORDER — HYDROMORPHONE HCL IN WATER/PF 6 MG/30 ML
PATIENT CONTROLLED ANALGESIA SYRINGE INTRAVENOUS
Status: DISPENSED
Start: 2022-05-04

## (undated) RX ORDER — FENTANYL CITRATE 50 UG/ML
INJECTION, SOLUTION INTRAMUSCULAR; INTRAVENOUS
Status: DISPENSED
Start: 2022-05-04

## (undated) RX ORDER — PROPOFOL 10 MG/ML
INJECTION, EMULSION INTRAVENOUS
Status: DISPENSED
Start: 2022-05-04

## (undated) RX ORDER — ONDANSETRON 2 MG/ML
INJECTION INTRAMUSCULAR; INTRAVENOUS
Status: DISPENSED
Start: 2024-07-12

## (undated) RX ORDER — GABAPENTIN 300 MG/1
CAPSULE ORAL
Status: DISPENSED
Start: 2024-07-12

## (undated) RX ORDER — ACETAMINOPHEN 325 MG/1
TABLET ORAL
Status: DISPENSED
Start: 2022-05-04

## (undated) RX ORDER — ONDANSETRON 2 MG/ML
INJECTION INTRAMUSCULAR; INTRAVENOUS
Status: DISPENSED
Start: 2022-05-04

## (undated) RX ORDER — BUPIVACAINE HYDROCHLORIDE AND EPINEPHRINE 2.5; 5 MG/ML; UG/ML
INJECTION, SOLUTION EPIDURAL; INFILTRATION; INTRACAUDAL; PERINEURAL
Status: DISPENSED
Start: 2022-05-04

## (undated) RX ORDER — BUPIVACAINE HYDROCHLORIDE 5 MG/ML
INJECTION, SOLUTION EPIDURAL; INTRACAUDAL
Status: DISPENSED
Start: 2024-07-12

## (undated) RX ORDER — KETOROLAC TROMETHAMINE 30 MG/ML
INJECTION, SOLUTION INTRAMUSCULAR; INTRAVENOUS
Status: DISPENSED
Start: 2024-07-12

## (undated) RX ORDER — LIDOCAINE HYDROCHLORIDE 10 MG/ML
INJECTION, SOLUTION INFILTRATION; PERINEURAL
Status: DISPENSED
Start: 2022-05-04

## (undated) RX ORDER — ACETAMINOPHEN 325 MG/1
TABLET ORAL
Status: DISPENSED
Start: 2024-07-12

## (undated) RX ORDER — FENTANYL CITRATE 50 UG/ML
INJECTION, SOLUTION INTRAMUSCULAR; INTRAVENOUS
Status: DISPENSED
Start: 2024-07-12

## (undated) RX ORDER — DEXMEDETOMIDINE HYDROCHLORIDE 100 UG/ML
INJECTION, SOLUTION INTRAVENOUS
Status: DISPENSED
Start: 2022-05-04

## (undated) RX ORDER — CEFAZOLIN SODIUM/WATER 2 G/20 ML
SYRINGE (ML) INTRAVENOUS
Status: DISPENSED
Start: 2024-07-12

## (undated) RX ORDER — OXYCODONE HYDROCHLORIDE 5 MG/1
TABLET ORAL
Status: DISPENSED
Start: 2024-07-12

## (undated) RX ORDER — LIDOCAINE HYDROCHLORIDE 10 MG/ML
INJECTION, SOLUTION EPIDURAL; INFILTRATION; INTRACAUDAL; PERINEURAL
Status: DISPENSED
Start: 2024-07-12

## (undated) RX ORDER — PROPOFOL 10 MG/ML
INJECTION, EMULSION INTRAVENOUS
Status: DISPENSED
Start: 2024-07-12

## (undated) RX ORDER — IPRATROPIUM BROMIDE AND ALBUTEROL SULFATE 2.5; .5 MG/3ML; MG/3ML
SOLUTION RESPIRATORY (INHALATION)
Status: DISPENSED
Start: 2024-07-12

## (undated) RX ORDER — DEXAMETHASONE SODIUM PHOSPHATE 4 MG/ML
INJECTION, SOLUTION INTRA-ARTICULAR; INTRALESIONAL; INTRAMUSCULAR; INTRAVENOUS; SOFT TISSUE
Status: DISPENSED
Start: 2024-07-12

## (undated) RX ORDER — KETOROLAC TROMETHAMINE 15 MG/ML
INJECTION, SOLUTION INTRAMUSCULAR; INTRAVENOUS
Status: DISPENSED
Start: 2022-05-04

## (undated) RX ORDER — DEXAMETHASONE SODIUM PHOSPHATE 4 MG/ML
INJECTION, SOLUTION INTRA-ARTICULAR; INTRALESIONAL; INTRAMUSCULAR; INTRAVENOUS; SOFT TISSUE
Status: DISPENSED
Start: 2022-05-04

## (undated) RX ORDER — GABAPENTIN 300 MG/1
CAPSULE ORAL
Status: DISPENSED
Start: 2022-05-04

## (undated) RX ORDER — LIDOCAINE HYDROCHLORIDE 10 MG/ML
INJECTION, SOLUTION EPIDURAL; INFILTRATION; INTRACAUDAL; PERINEURAL
Status: DISPENSED
Start: 2022-05-04

## (undated) RX ORDER — LIDOCAINE HYDROCHLORIDE AND EPINEPHRINE 10; 10 MG/ML; UG/ML
INJECTION, SOLUTION INFILTRATION; PERINEURAL
Status: DISPENSED
Start: 2024-07-12

## (undated) RX ORDER — MAGNESIUM SULFATE HEPTAHYDRATE 40 MG/ML
INJECTION, SOLUTION INTRAVENOUS
Status: DISPENSED
Start: 2022-05-04